# Patient Record
Sex: FEMALE | Race: WHITE | NOT HISPANIC OR LATINO | Employment: OTHER | URBAN - METROPOLITAN AREA
[De-identification: names, ages, dates, MRNs, and addresses within clinical notes are randomized per-mention and may not be internally consistent; named-entity substitution may affect disease eponyms.]

---

## 2017-01-21 ENCOUNTER — LAB CONVERSION - ENCOUNTER (OUTPATIENT)
Dept: OTHER | Facility: OTHER | Age: 62
End: 2017-01-21

## 2017-01-21 LAB
BUN SERPL-MCNC: 22 MG/DL (ref 7–25)
BUN/CREA RATIO (HISTORICAL): ABNORMAL (CALC) (ref 6–22)
CALCIUM SERPL-MCNC: 9.5 MG/DL (ref 8.6–10.4)
CHLORIDE SERPL-SCNC: 104 MMOL/L (ref 98–110)
CO2 SERPL-SCNC: 29 MMOL/L (ref 20–31)
CREAT SERPL-MCNC: 0.81 MG/DL (ref 0.5–0.99)
EGFR AFRICAN AMERICAN (HISTORICAL): 91 ML/MIN/1.73M2
EGFR-AMERICAN CALC (HISTORICAL): 78 ML/MIN/1.73M2
GLUCOSE (HISTORICAL): 119 MG/DL (ref 65–99)
HBA1C MFR BLD HPLC: 6.4 % OF TOTAL HGB
POTASSIUM SERPL-SCNC: 4.4 MMOL/L (ref 3.5–5.3)
SODIUM SERPL-SCNC: 140 MMOL/L (ref 135–146)

## 2017-01-24 ENCOUNTER — ALLSCRIPTS OFFICE VISIT (OUTPATIENT)
Dept: OTHER | Facility: OTHER | Age: 62
End: 2017-01-24

## 2017-01-24 DIAGNOSIS — Z12.11 ENCOUNTER FOR SCREENING FOR MALIGNANT NEOPLASM OF COLON: ICD-10-CM

## 2017-01-24 DIAGNOSIS — Z12.31 ENCOUNTER FOR SCREENING MAMMOGRAM FOR MALIGNANT NEOPLASM OF BREAST: ICD-10-CM

## 2017-02-09 ENCOUNTER — LAB CONVERSION - ENCOUNTER (OUTPATIENT)
Dept: OTHER | Facility: OTHER | Age: 62
End: 2017-02-09

## 2017-02-10 ENCOUNTER — GENERIC CONVERSION - ENCOUNTER (OUTPATIENT)
Dept: OTHER | Facility: OTHER | Age: 62
End: 2017-02-10

## 2017-02-15 ENCOUNTER — ALLSCRIPTS OFFICE VISIT (OUTPATIENT)
Dept: OTHER | Facility: OTHER | Age: 62
End: 2017-02-15

## 2017-02-15 ENCOUNTER — LAB REQUISITION (OUTPATIENT)
Dept: LAB | Facility: HOSPITAL | Age: 62
End: 2017-02-15
Payer: COMMERCIAL

## 2017-02-15 DIAGNOSIS — Z01.419 ENCOUNTER FOR GYNECOLOGICAL EXAMINATION WITHOUT ABNORMAL FINDING: ICD-10-CM

## 2017-02-15 PROCEDURE — G0145 SCR C/V CYTO,THINLAYER,RESCR: HCPCS | Performed by: OBSTETRICS & GYNECOLOGY

## 2017-02-15 PROCEDURE — 87624 HPV HI-RISK TYP POOLED RSLT: CPT | Performed by: OBSTETRICS & GYNECOLOGY

## 2017-02-20 LAB — HPV RRNA GENITAL QL NAA+PROBE: NORMAL

## 2017-02-23 LAB
LAB AP GYN PRIMARY INTERPRETATION: NORMAL
Lab: NORMAL

## 2017-03-01 ENCOUNTER — LAB REQUISITION (OUTPATIENT)
Dept: LAB | Facility: HOSPITAL | Age: 62
End: 2017-03-01
Payer: COMMERCIAL

## 2017-03-01 ENCOUNTER — ALLSCRIPTS OFFICE VISIT (OUTPATIENT)
Dept: OTHER | Facility: OTHER | Age: 62
End: 2017-03-01

## 2017-03-01 ENCOUNTER — HOSPITAL ENCOUNTER (OUTPATIENT)
Dept: RADIOLOGY | Facility: HOSPITAL | Age: 62
Discharge: HOME | End: 2017-03-01
Attending: OBSTETRICS & GYNECOLOGY
Payer: COMMERCIAL

## 2017-03-01 DIAGNOSIS — Z12.31 ENCOUNTER FOR SCREENING MAMMOGRAM FOR MALIGNANT NEOPLASM OF BREAST: ICD-10-CM

## 2017-03-01 DIAGNOSIS — N90.7 VULVAR CYST: ICD-10-CM

## 2017-03-01 PROCEDURE — 88304 TISSUE EXAM BY PATHOLOGIST: CPT | Performed by: OBSTETRICS & GYNECOLOGY

## 2017-03-01 PROCEDURE — 88312 SPECIAL STAINS GROUP 1: CPT | Performed by: OBSTETRICS & GYNECOLOGY

## 2017-03-01 PROCEDURE — G0202 SCR MAMMO BI INCL CAD: HCPCS

## 2017-03-08 ENCOUNTER — ALLSCRIPTS OFFICE VISIT (OUTPATIENT)
Dept: OTHER | Facility: OTHER | Age: 62
End: 2017-03-08

## 2017-03-08 DIAGNOSIS — M79.644 PAIN OF FINGER OF RIGHT HAND: ICD-10-CM

## 2017-03-08 DIAGNOSIS — M25.562 PAIN IN LEFT KNEE: ICD-10-CM

## 2017-03-09 ENCOUNTER — TRANSCRIBE ORDERS (OUTPATIENT)
Dept: RADIOLOGY | Facility: HOSPITAL | Age: 62
End: 2017-03-09

## 2017-03-09 ENCOUNTER — HOSPITAL ENCOUNTER (OUTPATIENT)
Dept: RADIOLOGY | Facility: HOSPITAL | Age: 62
Discharge: HOME | End: 2017-03-09
Payer: COMMERCIAL

## 2017-03-09 DIAGNOSIS — M25.562 PAIN IN LEFT KNEE: ICD-10-CM

## 2017-03-09 PROCEDURE — 73562 X-RAY EXAM OF KNEE 3: CPT

## 2017-03-15 ENCOUNTER — GENERIC CONVERSION - ENCOUNTER (OUTPATIENT)
Dept: OTHER | Facility: OTHER | Age: 62
End: 2017-03-15

## 2017-07-05 ENCOUNTER — ALLSCRIPTS OFFICE VISIT (OUTPATIENT)
Dept: OTHER | Facility: OTHER | Age: 62
End: 2017-07-05

## 2017-10-03 ENCOUNTER — GENERIC CONVERSION - ENCOUNTER (OUTPATIENT)
Dept: OTHER | Facility: OTHER | Age: 62
End: 2017-10-03

## 2017-12-01 ENCOUNTER — GENERIC CONVERSION - ENCOUNTER (OUTPATIENT)
Dept: OTHER | Facility: OTHER | Age: 62
End: 2017-12-01

## 2018-01-11 NOTE — RESULT NOTES
Verified Results  (Q) FECAL GLOBIN BY IMMUNOCHEMISTRY [id number] 12:00AM Dineen Canavan   REPORT COMMENT:  FEJustin Lolo Quiroga Herbert [id number]     Test Name Result Flag Reference   FECAL GLOBIN BY$IMMUNOCHEMISTRY      FECAL GLOBIN BY IMMUNOCHEMISTRY         MICRO NUMBER:      [id number]    TEST STATUS:       FINAL    SPECIMEN SOURCE:   INSURE (TM) FOBT TEST CARD    SPECIMEN QUALITY:  ADEQUATE    RESULT:            Not Detected                                               We received an InSure card with a non-specific                       order  Based upon the specimen submitted, the                       fecal globin test was performed  If this is not                       what you intended to order, please contact your                       local  immediately                       so that we can adjust our billing appropriately  You may also inquire about alternative or                        additional testing

## 2018-01-11 NOTE — RESULT NOTES
Verified Results  * XR KNEE 3 VW LEFT NON INJURY [id number] 03:54PM Bryan Hilario Order Number: [id number]     Test Name Result Flag Reference   XR KNEE 3 VW LEFT (Report)     LEFT KNEE     INDICATION: Left knee pain  COMPARISON: None     VIEWS: 3     IMAGES: 3     FINDINGS:     There is no acute fracture or dislocation  There is no joint effusion  No degenerative changes  No lytic or blastic lesions are seen  Vascular calcifications  IMPRESSION:     No acute osseous abnormality         Workstation performed: [id number]     Signed by:   Leonardo Nuñez DO   3/11/17

## 2018-01-12 VITALS
SYSTOLIC BLOOD PRESSURE: 129 MMHG | HEART RATE: 74 BPM | DIASTOLIC BLOOD PRESSURE: 79 MMHG | BODY MASS INDEX: 32.54 KG/M2 | WEIGHT: 214 LBS

## 2018-01-12 DIAGNOSIS — E11.9 TYPE 2 DIABETES MELLITUS WITHOUT COMPLICATIONS (HCC): ICD-10-CM

## 2018-01-12 DIAGNOSIS — E78.5 HYPERLIPIDEMIA: ICD-10-CM

## 2018-01-13 VITALS
BODY MASS INDEX: 33.08 KG/M2 | SYSTOLIC BLOOD PRESSURE: 133 MMHG | HEART RATE: 67 BPM | DIASTOLIC BLOOD PRESSURE: 81 MMHG | HEIGHT: 68 IN | WEIGHT: 218.25 LBS

## 2018-01-13 VITALS
WEIGHT: 213 LBS | BODY MASS INDEX: 32.28 KG/M2 | SYSTOLIC BLOOD PRESSURE: 132 MMHG | HEIGHT: 68 IN | DIASTOLIC BLOOD PRESSURE: 68 MMHG

## 2018-01-13 VITALS
DIASTOLIC BLOOD PRESSURE: 76 MMHG | HEIGHT: 68 IN | WEIGHT: 211.8 LBS | SYSTOLIC BLOOD PRESSURE: 130 MMHG | BODY MASS INDEX: 32.1 KG/M2 | HEART RATE: 76 BPM

## 2018-01-14 VITALS
SYSTOLIC BLOOD PRESSURE: 122 MMHG | WEIGHT: 212.8 LBS | TEMPERATURE: 96.3 F | HEART RATE: 77 BPM | HEIGHT: 68 IN | DIASTOLIC BLOOD PRESSURE: 78 MMHG | BODY MASS INDEX: 32.25 KG/M2

## 2018-01-14 VITALS
DIASTOLIC BLOOD PRESSURE: 72 MMHG | WEIGHT: 209.5 LBS | HEIGHT: 68 IN | SYSTOLIC BLOOD PRESSURE: 140 MMHG | BODY MASS INDEX: 31.75 KG/M2

## 2018-01-24 VITALS
HEIGHT: 68 IN | HEART RATE: 69 BPM | DIASTOLIC BLOOD PRESSURE: 75 MMHG | WEIGHT: 218 LBS | SYSTOLIC BLOOD PRESSURE: 131 MMHG | BODY MASS INDEX: 33.04 KG/M2

## 2018-01-26 ENCOUNTER — OFFICE VISIT (OUTPATIENT)
Dept: FAMILY MEDICINE CLINIC | Facility: CLINIC | Age: 63
End: 2018-01-26
Payer: COMMERCIAL

## 2018-01-26 VITALS
HEART RATE: 72 BPM | DIASTOLIC BLOOD PRESSURE: 80 MMHG | RESPIRATION RATE: 18 BRPM | SYSTOLIC BLOOD PRESSURE: 142 MMHG | TEMPERATURE: 97.3 F | WEIGHT: 221.13 LBS | BODY MASS INDEX: 34.71 KG/M2 | HEIGHT: 67 IN

## 2018-01-26 DIAGNOSIS — E11.9 TYPE 2 DIABETES MELLITUS WITHOUT COMPLICATION, WITHOUT LONG-TERM CURRENT USE OF INSULIN (HCC): Primary | ICD-10-CM

## 2018-01-26 DIAGNOSIS — I25.118 CORONARY ARTERY DISEASE INVOLVING NATIVE HEART WITH OTHER FORM OF ANGINA PECTORIS, UNSPECIFIED VESSEL OR LESION TYPE (HCC): ICD-10-CM

## 2018-01-26 PROCEDURE — 99214 OFFICE O/P EST MOD 30 MIN: CPT | Performed by: FAMILY MEDICINE

## 2018-01-26 RX ORDER — METFORMIN HYDROCHLORIDE EXTENDED-RELEASE TABLETS 1000 MG/1
1 TABLET, FILM COATED, EXTENDED RELEASE ORAL 2 TIMES DAILY
COMMUNITY
Start: 2014-07-14 | End: 2018-01-26

## 2018-01-26 RX ORDER — CLOPIDOGREL BISULFATE 75 MG/1
TABLET ORAL
COMMUNITY
Start: 2016-09-29 | End: 2018-08-16 | Stop reason: SDUPTHER

## 2018-01-26 RX ORDER — NITROGLYCERIN 0.4 MG/1
1 TABLET SUBLINGUAL
COMMUNITY
End: 2018-01-26 | Stop reason: SDUPTHER

## 2018-01-26 RX ORDER — NITROGLYCERIN 0.4 MG/1
0.4 TABLET SUBLINGUAL
Qty: 100 TABLET | Refills: 0 | Status: SHIPPED | OUTPATIENT
Start: 2018-01-26 | End: 2019-06-27 | Stop reason: SDUPTHER

## 2018-01-26 RX ORDER — RABEPRAZOLE SODIUM 20 MG/1
1 TABLET, DELAYED RELEASE ORAL DAILY
COMMUNITY
Start: 2016-03-22 | End: 2019-01-09 | Stop reason: SDUPTHER

## 2018-01-26 RX ORDER — METOPROLOL TARTRATE 50 MG/1
1 TABLET, FILM COATED ORAL DAILY
COMMUNITY
End: 2019-03-22 | Stop reason: SDUPTHER

## 2018-01-26 RX ORDER — EZETIMIBE AND SIMVASTATIN 10; 80 MG/1; MG/1
1 TABLET ORAL DAILY
COMMUNITY
Start: 2017-12-13 | End: 2018-04-03 | Stop reason: SDUPTHER

## 2018-01-26 RX ORDER — FELODIPINE 2.5 MG/1
1 TABLET, EXTENDED RELEASE ORAL DAILY
COMMUNITY
End: 2018-07-26 | Stop reason: SDUPTHER

## 2018-01-26 RX ORDER — TRAMADOL HYDROCHLORIDE 50 MG/1
1 TABLET ORAL 4 TIMES DAILY PRN
COMMUNITY
Start: 2014-08-18 | End: 2018-03-26 | Stop reason: SDUPTHER

## 2018-01-26 RX ORDER — VENLAFAXINE HYDROCHLORIDE 75 MG/1
1 CAPSULE, EXTENDED RELEASE ORAL DAILY
COMMUNITY
Start: 2016-02-05 | End: 2018-05-26 | Stop reason: SDUPTHER

## 2018-01-26 NOTE — PROGRESS NOTES
Assessment/Plan:    No problem-specific Assessment & Plan notes found for this encounter  There are no diagnoses linked to this encounter  Subjective:      Patient ID: Mable Landaverde is a 58 y o  female      HERE FOR FOLLOW UP OF DIABETES did  Not get lab      Diabetes         The following portions of the patient's history were reviewed and updated as appropriate: allergies, current medications, past family history, past medical history, past social history, past surgical history and problem list     Review of Systems      Objective:     Physical Exam

## 2018-02-27 LAB
ALBUMIN SERPL-MCNC: 4.4 G/DL (ref 3.6–5.1)
ALBUMIN/GLOB SERPL: 1.7 (CALC) (ref 1–2.5)
ALP SERPL-CCNC: 64 U/L (ref 33–130)
ALT SERPL-CCNC: 20 U/L (ref 6–29)
AST SERPL-CCNC: 16 U/L (ref 10–35)
BILIRUB SERPL-MCNC: 0.5 MG/DL (ref 0.2–1.2)
BUN SERPL-MCNC: 12 MG/DL (ref 7–25)
BUN/CREAT SERPL: ABNORMAL (CALC) (ref 6–22)
CALCIUM SERPL-MCNC: 9.5 MG/DL (ref 8.6–10.4)
CHLORIDE SERPL-SCNC: 103 MMOL/L (ref 98–110)
CHOLEST SERPL-MCNC: 179 MG/DL
CHOLEST/HDLC SERPL: 3.7 (CALC)
CO2 SERPL-SCNC: 29 MMOL/L (ref 20–31)
CREAT SERPL-MCNC: 0.79 MG/DL (ref 0.5–0.99)
GLOBULIN SER CALC-MCNC: 2.6 G/DL (CALC) (ref 1.9–3.7)
GLUCOSE SERPL-MCNC: 157 MG/DL (ref 65–99)
HDLC SERPL-MCNC: 48 MG/DL
LDLC SERPL CALC-MCNC: 99 MG/DL (CALC)
NONHDLC SERPL-MCNC: 131 MG/DL (CALC)
POTASSIUM SERPL-SCNC: 4.6 MMOL/L (ref 3.5–5.3)
PROT SERPL-MCNC: 7 G/DL (ref 6.1–8.1)
SL AMB EGFR AFRICAN AMERICAN: 93 ML/MIN/1.73M2
SL AMB EGFR NON AFRICAN AMERICAN: 80 ML/MIN/1.73M2
SODIUM SERPL-SCNC: 141 MMOL/L (ref 135–146)
TRIGL SERPL-MCNC: 208 MG/DL
TSH SERPL-ACNC: 3.18 MIU/L (ref 0.4–4.5)

## 2018-03-01 ENCOUNTER — TELEPHONE (OUTPATIENT)
Dept: FAMILY MEDICINE CLINIC | Facility: CLINIC | Age: 63
End: 2018-03-01

## 2018-03-01 NOTE — TELEPHONE ENCOUNTER
Patient notified of results  Patient has an appointment scheduled on 6/27/2018        ----- Message from Izzy Monteiro, DO sent at 2/28/2018  7:00 PM EST -----  Labs look pretty good exceppt sugar a bit higher than we like   Next ov?   We will recheck and adjust meds

## 2018-03-26 DIAGNOSIS — R52 PAIN: Primary | ICD-10-CM

## 2018-03-28 RX ORDER — TRAMADOL HYDROCHLORIDE 50 MG/1
50 TABLET ORAL 4 TIMES DAILY PRN
Qty: 120 TABLET | Refills: 0 | Status: SHIPPED | OUTPATIENT
Start: 2018-03-28 | End: 2018-04-03 | Stop reason: SDUPTHER

## 2018-03-30 ENCOUNTER — TELEPHONE (OUTPATIENT)
Dept: FAMILY MEDICINE CLINIC | Facility: CLINIC | Age: 63
End: 2018-03-30

## 2018-03-30 DIAGNOSIS — E78.5 HYPERLIPIDEMIA, UNSPECIFIED HYPERLIPIDEMIA TYPE: Primary | ICD-10-CM

## 2018-03-30 RX ORDER — EZETIMIBE AND SIMVASTATIN 10; 80 MG/1; MG/1
1 TABLET ORAL DAILY
Qty: 90 TABLET | Refills: 0 | Status: CANCELLED | OUTPATIENT
Start: 2018-03-30

## 2018-04-03 DIAGNOSIS — E78.5 HYPERLIPIDEMIA, UNSPECIFIED HYPERLIPIDEMIA TYPE: Primary | ICD-10-CM

## 2018-04-03 DIAGNOSIS — R52 PAIN: ICD-10-CM

## 2018-04-03 RX ORDER — EZETIMIBE AND SIMVASTATIN 10; 80 MG/1; MG/1
1 TABLET ORAL DAILY
Qty: 90 TABLET | Refills: 0 | Status: SHIPPED | OUTPATIENT
Start: 2018-04-03 | End: 2018-07-02

## 2018-04-03 RX ORDER — EZETIMIBE 10 MG/1
10 TABLET ORAL DAILY
Qty: 90 TABLET | Refills: 0 | Status: SHIPPED | OUTPATIENT
Start: 2018-04-03 | End: 2018-07-07 | Stop reason: SDUPTHER

## 2018-04-03 RX ORDER — SIMVASTATIN 80 MG
80 TABLET ORAL
Qty: 90 TABLET | Refills: 0 | Status: SHIPPED | OUTPATIENT
Start: 2018-04-03 | End: 2018-07-07 | Stop reason: SDUPTHER

## 2018-04-03 RX ORDER — TRAMADOL HYDROCHLORIDE 50 MG/1
50 TABLET ORAL EVERY 6 HOURS PRN
Qty: 120 TABLET | OUTPATIENT
Start: 2018-04-03 | End: 2018-05-03

## 2018-04-03 RX ORDER — TRAMADOL HYDROCHLORIDE 50 MG/1
50 TABLET ORAL 4 TIMES DAILY PRN
Qty: 120 TABLET | Refills: 0 | Status: SHIPPED | OUTPATIENT
Start: 2018-04-03 | End: 2018-05-03

## 2018-04-04 ENCOUNTER — TELEPHONE (OUTPATIENT)
Dept: FAMILY MEDICINE CLINIC | Facility: CLINIC | Age: 63
End: 2018-04-04

## 2018-05-10 DIAGNOSIS — E11.9 TYPE 2 DIABETES MELLITUS WITHOUT COMPLICATION, WITHOUT LONG-TERM CURRENT USE OF INSULIN (HCC): ICD-10-CM

## 2018-05-18 ENCOUNTER — TELEPHONE (OUTPATIENT)
Dept: FAMILY MEDICINE CLINIC | Facility: CLINIC | Age: 63
End: 2018-05-18

## 2018-05-21 ENCOUNTER — TELEPHONE (OUTPATIENT)
Dept: FAMILY MEDICINE CLINIC | Facility: CLINIC | Age: 63
End: 2018-05-21

## 2018-05-26 DIAGNOSIS — F32.A DEPRESSION, UNSPECIFIED DEPRESSION TYPE: Primary | ICD-10-CM

## 2018-06-01 RX ORDER — VENLAFAXINE HYDROCHLORIDE 75 MG/1
CAPSULE, EXTENDED RELEASE ORAL
Qty: 90 CAPSULE | Refills: 2 | Status: SHIPPED | OUTPATIENT
Start: 2018-06-01 | End: 2019-01-09 | Stop reason: SDUPTHER

## 2018-06-26 PROBLEM — M79.644 PAIN OF FINGER OF RIGHT HAND: Status: ACTIVE | Noted: 2017-03-08

## 2018-06-26 PROBLEM — M65.331 TRIGGER MIDDLE FINGER OF RIGHT HAND: Status: ACTIVE | Noted: 2017-01-24

## 2018-06-26 RX ORDER — PEN NEEDLE, DIABETIC 31 GX5/16"
1 NEEDLE, DISPOSABLE MISCELLANEOUS
COMMUNITY
Start: 2018-04-05 | End: 2018-08-16 | Stop reason: SDUPTHER

## 2018-06-26 RX ORDER — TRAMADOL HYDROCHLORIDE 50 MG/1
TABLET ORAL
COMMUNITY
Start: 2018-05-23 | End: 2018-07-16 | Stop reason: SDUPTHER

## 2018-06-27 ENCOUNTER — OFFICE VISIT (OUTPATIENT)
Dept: FAMILY MEDICINE CLINIC | Facility: CLINIC | Age: 63
End: 2018-06-27
Payer: COMMERCIAL

## 2018-06-27 VITALS
WEIGHT: 221.6 LBS | TEMPERATURE: 96.2 F | BODY MASS INDEX: 34.78 KG/M2 | RESPIRATION RATE: 18 BRPM | SYSTOLIC BLOOD PRESSURE: 130 MMHG | DIASTOLIC BLOOD PRESSURE: 72 MMHG | HEIGHT: 67 IN | HEART RATE: 64 BPM

## 2018-06-27 DIAGNOSIS — Z11.59 ENCOUNTER FOR HEPATITIS C SCREENING TEST FOR LOW RISK PATIENT: ICD-10-CM

## 2018-06-27 DIAGNOSIS — Z12.39 SCREENING FOR BREAST CANCER: ICD-10-CM

## 2018-06-27 DIAGNOSIS — E78.2 MIXED HYPERLIPIDEMIA: ICD-10-CM

## 2018-06-27 DIAGNOSIS — E11.9 TYPE 2 DIABETES MELLITUS WITHOUT COMPLICATION, WITHOUT LONG-TERM CURRENT USE OF INSULIN (HCC): Primary | ICD-10-CM

## 2018-06-27 DIAGNOSIS — Z12.11 SCREEN FOR COLON CANCER: ICD-10-CM

## 2018-06-27 DIAGNOSIS — I10 BENIGN ESSENTIAL HYPERTENSION: ICD-10-CM

## 2018-06-27 PROCEDURE — 3075F SYST BP GE 130 - 139MM HG: CPT | Performed by: NURSE PRACTITIONER

## 2018-06-27 PROCEDURE — 99214 OFFICE O/P EST MOD 30 MIN: CPT | Performed by: NURSE PRACTITIONER

## 2018-06-27 PROCEDURE — 3078F DIAST BP <80 MM HG: CPT | Performed by: NURSE PRACTITIONER

## 2018-06-27 NOTE — ASSESSMENT & PLAN NOTE
Lab Results   Component Value Date    HGBA1C 6 4 (H) 01/20/2017       No results for input(s): POCGLU in the last 72 hours  Blood Sugar Average: Last 72 hrs:       Patient states she checks her sugars occasionally

## 2018-06-27 NOTE — PROGRESS NOTES
Chief Complaint   Patient presents with    Diabetes       Assessment/Plan:    Type 2 diabetes mellitus without complication, without long-term current use of insulin (HCC)  Lab Results   Component Value Date    HGBA1C 6 4 (H) 01/20/2017       No results for input(s): POCGLU in the last 72 hours  Blood Sugar Average: Last 72 hrs:       Patient states she checks her sugars occasionally  Mixed hyperlipidemia  TRIGs 208 LDL 99       Diagnoses and all orders for this visit:    Type 2 diabetes mellitus without complication, without long-term current use of insulin (HCC)  -     Hemoglobin A1C  -     Lipid panel  -     Microalbumin / creatinine urine ratio    Benign essential hypertension  -     Hemoglobin A1C  -     Lipid panel    Mixed hyperlipidemia  -     Hemoglobin A1C  -     Lipid panel    Screening for breast cancer  -     Mammo screening bilateral w cad; Future    Screen for colon cancer  -     Occult Bloood,Fecal Immunochemical; Future    Encounter for hepatitis C screening test for low risk patient  -     Hepatitis C antibody; Future        Patient states she ok on any medication  Subjective:      Patient ID: Noelle Dozier is a 58 y o  female  patietn here for her follow up on her diabetes, high blood pressure and cholesterol  The following portions of the patient's history were reviewed and updated as appropriate: allergies, current medications, past family history, past medical history, past social history, past surgical history and problem list     Review of Systems   Constitutional: Negative for chills, diaphoresis, fatigue, fever and unexpected weight change  HENT: Negative for trouble swallowing  Eyes: Negative for visual disturbance  Respiratory: Negative for cough, chest tightness and shortness of breath  Cardiovascular: Negative for chest pain, palpitations and leg swelling  Gastrointestinal: Negative for diarrhea     Endocrine: Negative for polydipsia, polyphagia and polyuria  Genitourinary: Negative for decreased urine volume  Musculoskeletal: Negative for myalgias  Skin: Negative for wound  Neurological: Negative for dizziness, weakness, light-headedness and numbness  Objective:      /72 (BP Location: Left arm, Patient Position: Sitting, Cuff Size: Adult)   Pulse 64   Temp (!) 96 2 °F (35 7 °C) (Tympanic)   Resp 18   Ht 5' 7 25" (1 708 m)   Wt 101 kg (221 lb 9 6 oz)   Breastfeeding? No   BMI 34 45 kg/m²          Physical Exam   Constitutional: Vital signs are normal  She appears well-developed and well-nourished  She does not have a sickly appearance  She does not appear ill  HENT:   Head: Normocephalic and atraumatic  Neck: Carotid bruit is not present  No thyromegaly present  Cardiovascular: Normal rate, regular rhythm, S1 normal and S2 normal   Pulses are no weak pulses  No murmur heard  Pulses:       Carotid pulses are 2+ on the right side, and 2+ on the left side  Dorsalis pedis pulses are 2+ on the right side, and 2+ on the left side  Posterior tibial pulses are 2+ on the right side, and 2+ on the left side  No Lower extremity Edema   Pulmonary/Chest: Effort normal and breath sounds normal    Abdominal: Soft  Normal appearance and bowel sounds are normal  There is no tenderness  Feet:   Right Foot:   Skin Integrity: Negative for ulcer, skin breakdown, erythema, warmth, callus or dry skin  Left Foot:   Skin Integrity: Negative for ulcer, skin breakdown, erythema, warmth, callus or dry skin  Lymphadenopathy:     She has no cervical adenopathy  Skin: Skin is warm, dry and intact  Patient's shoes and socks removed  Right Foot/Ankle   Right Foot Inspection  Skin Exam: skin normal and skin intact no dry skin, no warmth, no callus, no erythema, no maceration, no abnormal color, no pre-ulcer, no ulcer and no callus                          Toe Exam: ROM and strength within normal limits  Sensory Vibration: intact  Proprioception: intact   Monofilament testing: intact  Vascular  Capillary refills: < 3 seconds  The right DP pulse is 2+  The right PT pulse is 2+  Left Foot/Ankle  Left Foot Inspection  Skin Exam: skin normal and skin intactno dry skin, no warmth, no erythema, no maceration, normal color, no pre-ulcer, no ulcer and no callus                         Toe Exam: ROM and strength within normal limits                   Sensory   Vibration: intact  Proprioception: intact  Monofilament: intact  Vascular  Capillary refills: < 3 seconds  The left DP pulse is 2+  The left PT pulse is 2+  Assign Risk Category:  No deformity present; No loss of protective sensation;  No weak pulses       Risk: 0

## 2018-07-01 DIAGNOSIS — E78.5 HYPERLIPIDEMIA, UNSPECIFIED HYPERLIPIDEMIA TYPE: ICD-10-CM

## 2018-07-01 RX ORDER — SIMVASTATIN 80 MG
80 TABLET ORAL
Qty: 90 TABLET | Refills: 0 | OUTPATIENT
Start: 2018-07-01 | End: 2018-09-29

## 2018-07-01 RX ORDER — EZETIMIBE 10 MG/1
10 TABLET ORAL DAILY
Qty: 90 TABLET | Refills: 0 | OUTPATIENT
Start: 2018-07-01 | End: 2018-09-29

## 2018-07-07 DIAGNOSIS — E78.5 HYPERLIPIDEMIA, UNSPECIFIED HYPERLIPIDEMIA TYPE: ICD-10-CM

## 2018-07-08 RX ORDER — SIMVASTATIN 80 MG
80 TABLET ORAL
Qty: 90 TABLET | Refills: 0 | Status: SHIPPED | OUTPATIENT
Start: 2018-07-08 | End: 2018-07-10 | Stop reason: SDUPTHER

## 2018-07-08 RX ORDER — EZETIMIBE 10 MG/1
10 TABLET ORAL DAILY
Qty: 90 TABLET | Refills: 0 | Status: SHIPPED | OUTPATIENT
Start: 2018-07-08 | End: 2018-07-10 | Stop reason: SDUPTHER

## 2018-07-10 DIAGNOSIS — E78.5 HYPERLIPIDEMIA, UNSPECIFIED HYPERLIPIDEMIA TYPE: ICD-10-CM

## 2018-07-10 RX ORDER — EZETIMIBE 10 MG/1
10 TABLET ORAL DAILY
Qty: 90 TABLET | Refills: 0 | Status: SHIPPED | OUTPATIENT
Start: 2018-07-10 | End: 2018-12-05 | Stop reason: SDUPTHER

## 2018-07-10 RX ORDER — SIMVASTATIN 80 MG
80 TABLET ORAL
Qty: 90 TABLET | Refills: 0 | Status: SHIPPED | OUTPATIENT
Start: 2018-07-10 | End: 2018-12-07 | Stop reason: SDUPTHER

## 2018-07-10 NOTE — TELEPHONE ENCOUNTER
Patient left voicemail requesting medication refill on her Simvastatin 80 Mg and Ezetimibe 10 Mg to be sent to  [facility]    Patient was last seen on 6/27/2018

## 2018-07-11 ENCOUNTER — TELEPHONE (OUTPATIENT)
Dept: FAMILY MEDICINE CLINIC | Facility: CLINIC | Age: 63
End: 2018-07-11

## 2018-07-11 DIAGNOSIS — M54.5 CHRONIC LOW BACK PAIN, UNSPECIFIED BACK PAIN LATERALITY, WITH SCIATICA PRESENCE UNSPECIFIED: Primary | ICD-10-CM

## 2018-07-11 DIAGNOSIS — G89.29 CHRONIC LOW BACK PAIN, UNSPECIFIED BACK PAIN LATERALITY, WITH SCIATICA PRESENCE UNSPECIFIED: Primary | ICD-10-CM

## 2018-07-11 NOTE — TELEPHONE ENCOUNTER
Pt needs her tramadol rx renewed  She gets 50mg tabs  Stated she used to get 1 tab 4 times a day for 30 days but she only takes 1 tab a day  We did have issues w/ PA in past  If you could do qty 30 for 30 days maybe her insurance will approve this w/ o any authorization  Last time pt stated that they would only prescribe her 8 pills  Pt was seen on June 27, 2018 by you  She said she has enough for 2 more weeks but not sure if authorization is needed this time  [facility] [address]  Thanks  Please keep pt updated on any PA statuses if one is needed

## 2018-07-13 ENCOUNTER — APPOINTMENT (OUTPATIENT)
Dept: LAB | Facility: HOSPITAL | Age: 63
End: 2018-07-13
Payer: COMMERCIAL

## 2018-07-13 DIAGNOSIS — Z12.11 SCREEN FOR COLON CANCER: ICD-10-CM

## 2018-07-13 LAB — HEMOCCULT STL QL IA: POSITIVE

## 2018-07-13 PROCEDURE — G0328 FECAL BLOOD SCRN IMMUNOASSAY: HCPCS

## 2018-07-15 DIAGNOSIS — Z12.11 SCREEN FOR COLON CANCER: ICD-10-CM

## 2018-07-15 DIAGNOSIS — R19.5 POSITIVE FECAL OCCULT BLOOD TEST: Primary | ICD-10-CM

## 2018-07-16 ENCOUNTER — TELEPHONE (OUTPATIENT)
Dept: FAMILY MEDICINE CLINIC | Facility: CLINIC | Age: 63
End: 2018-07-16

## 2018-07-16 RX ORDER — TRAMADOL HYDROCHLORIDE 50 MG/1
50 TABLET ORAL DAILY
Qty: 30 TABLET | Refills: 0 | Status: SHIPPED | OUTPATIENT
Start: 2018-07-16 | End: 2018-08-28 | Stop reason: SDUPTHER

## 2018-07-16 NOTE — TELEPHONE ENCOUNTER
----- Message from [address] [place] [address] sent at 7/15/2018 11:35 PM EDT -----  Fecal occult positive  recommend GI eval for full colonoscopy

## 2018-07-16 NOTE — TELEPHONE ENCOUNTER
Pt notified of results and recommendations  Pt wanted to know if she can wait a couple of weeks and have the home kit redone since she has tested neg in the past  Psl advise

## 2018-07-20 DIAGNOSIS — E11.9 TYPE 2 DIABETES MELLITUS WITHOUT COMPLICATION, WITHOUT LONG-TERM CURRENT USE OF INSULIN (HCC): ICD-10-CM

## 2018-07-26 DIAGNOSIS — I10 BENIGN ESSENTIAL HYPERTENSION: Primary | ICD-10-CM

## 2018-07-26 NOTE — TELEPHONE ENCOUNTER
Patient states she needs a prior auth number and to call [phone]  I started the prior auth on navinet, and I called and spoke to [facility] and she does not need prior auth for tramadol  I notified pt

## 2018-07-27 RX ORDER — FELODIPINE 2.5 MG/1
2.5 TABLET, EXTENDED RELEASE ORAL DAILY
Qty: 30 TABLET | Refills: 3 | Status: SHIPPED | OUTPATIENT
Start: 2018-07-27

## 2018-08-06 ENCOUNTER — HOSPITAL ENCOUNTER (OUTPATIENT)
Dept: RADIOLOGY | Facility: HOSPITAL | Age: 63
Discharge: HOME | End: 2018-08-06
Payer: COMMERCIAL

## 2018-08-06 ENCOUNTER — TELEPHONE (OUTPATIENT)
Dept: FAMILY MEDICINE CLINIC | Facility: CLINIC | Age: 63
End: 2018-08-06

## 2018-08-06 DIAGNOSIS — Z12.39 SCREENING FOR BREAST CANCER: ICD-10-CM

## 2018-08-06 PROCEDURE — 77067 SCR MAMMO BI INCL CAD: CPT

## 2018-08-06 NOTE — TELEPHONE ENCOUNTER
----- Message from [address] [place] [address] sent at 8/6/2018  5:34 PM EDT -----  mammo normal

## 2018-08-16 DIAGNOSIS — E11.9 TYPE 2 DIABETES MELLITUS WITHOUT COMPLICATION, WITHOUT LONG-TERM CURRENT USE OF INSULIN (HCC): ICD-10-CM

## 2018-08-16 DIAGNOSIS — I25.10 CORONARY ARTERY DISEASE INVOLVING NATIVE CORONARY ARTERY OF NATIVE HEART, ANGINA PRESENCE UNSPECIFIED: Primary | ICD-10-CM

## 2018-08-16 LAB
ALBUMIN/CREAT UR: 3 MCG/MG CREAT
CHOLEST SERPL-MCNC: 173 MG/DL
CHOLEST/HDLC SERPL: 3.7 (CALC)
CREAT UR-MCNC: 58 MG/DL (ref 20–320)
HBA1C MFR BLD: 7.6 % OF TOTAL HGB
HCV AB S/CO SERPL IA: 0.02
HCV AB SERPL QL IA: NORMAL
HDLC SERPL-MCNC: 47 MG/DL
LDLC SERPL CALC-MCNC: 99 MG/DL (CALC)
MICROALBUMIN UR-MCNC: 0.2 MG/DL
NONHDLC SERPL-MCNC: 126 MG/DL (CALC)
TRIGL SERPL-MCNC: 170 MG/DL

## 2018-08-16 NOTE — TELEPHONE ENCOUNTER
Pt called Rx line for med refill for her plavix 75mg 90 days to be [facility] to [facility], BD pen needles 90 day supply   Pt last ov 6/27/18 bw 8/15/18

## 2018-08-17 RX ORDER — PEN NEEDLE, DIABETIC 31 GX5/16"
1 NEEDLE, DISPOSABLE MISCELLANEOUS DAILY
Qty: 100 EACH | Refills: 0 | Status: SHIPPED | OUTPATIENT
Start: 2018-08-17 | End: 2018-11-20 | Stop reason: SDUPTHER

## 2018-08-17 RX ORDER — CLOPIDOGREL BISULFATE 75 MG/1
75 TABLET ORAL DAILY
Qty: 90 TABLET | Refills: 1 | Status: SHIPPED | OUTPATIENT
Start: 2018-08-17 | End: 2019-01-09 | Stop reason: SDUPTHER

## 2018-08-21 ENCOUNTER — TELEPHONE (OUTPATIENT)
Dept: FAMILY MEDICINE CLINIC | Facility: CLINIC | Age: 63
End: 2018-08-21

## 2018-08-21 NOTE — TELEPHONE ENCOUNTER
----- Message from Francis Eastman, [address] sent at 8/20/2018 10:59 PM EDT -----  Cholesterol is improving still could be better  A1c is up at 7 6%  Hep c negative  Monitor carbs really watch diet

## 2018-08-28 DIAGNOSIS — G89.29 CHRONIC LOW BACK PAIN, UNSPECIFIED BACK PAIN LATERALITY, WITH SCIATICA PRESENCE UNSPECIFIED: ICD-10-CM

## 2018-08-28 DIAGNOSIS — M54.5 CHRONIC LOW BACK PAIN, UNSPECIFIED BACK PAIN LATERALITY, WITH SCIATICA PRESENCE UNSPECIFIED: ICD-10-CM

## 2018-08-28 NOTE — TELEPHONE ENCOUNTER
Patient left voice mail requesting medication refill on her Tramadol 50 gm to be sent to her [facility] pharmacy on file    Patient's last OV was 6/27/2018

## 2018-08-29 RX ORDER — TRAMADOL HYDROCHLORIDE 50 MG/1
50 TABLET ORAL DAILY
Qty: 30 TABLET | Refills: 3 | Status: SHIPPED | OUTPATIENT
Start: 2018-08-29 | End: 2018-12-21 | Stop reason: SDUPTHER

## 2018-09-10 ENCOUNTER — OFFICE VISIT (OUTPATIENT)
Dept: GASTROENTEROLOGY | Facility: CLINIC | Age: 63
End: 2018-09-10
Payer: COMMERCIAL

## 2018-09-10 VITALS
WEIGHT: 226.2 LBS | SYSTOLIC BLOOD PRESSURE: 122 MMHG | TEMPERATURE: 97 F | DIASTOLIC BLOOD PRESSURE: 66 MMHG | HEART RATE: 67 BPM | HEIGHT: 67 IN | BODY MASS INDEX: 35.5 KG/M2

## 2018-09-10 DIAGNOSIS — R19.5 OCCULT BLOOD IN STOOLS: ICD-10-CM

## 2018-09-10 DIAGNOSIS — K21.9 GASTROESOPHAGEAL REFLUX DISEASE, ESOPHAGITIS PRESENCE NOT SPECIFIED: Primary | ICD-10-CM

## 2018-09-10 DIAGNOSIS — R19.5 POSITIVE FECAL OCCULT BLOOD TEST: ICD-10-CM

## 2018-09-10 DIAGNOSIS — Z12.11 SCREEN FOR COLON CANCER: ICD-10-CM

## 2018-09-10 PROCEDURE — 99244 OFF/OP CNSLTJ NEW/EST MOD 40: CPT | Performed by: INTERNAL MEDICINE

## 2018-09-10 RX ORDER — FAMOTIDINE 40 MG/1
40 TABLET, FILM COATED ORAL DAILY
Qty: 30 TABLET | Refills: 4 | Status: SHIPPED | OUTPATIENT
Start: 2018-09-10

## 2018-09-10 NOTE — PATIENT INSTRUCTIONS
EGD/COLON scheduled with Dr Hawk at [facility] on 10/17/2018  Osiel Hogue gave pt verbal instructions/paper work given  Prep Golytely/Dulcolax  Pt is Diabetic/medication clearance faxed to [facility]

## 2018-09-10 NOTE — PROGRESS NOTES
[facility] - Outpatient Consultation  Eun Estrada 58 y o  female MRN: [id number]  Encounter: [id number]          ASSESSMENT AND PLAN:      1  Positive fecal occult blood test  She was identified to have Hemoccult-positive without any evidence of hemorrhoids or any other cause explain this  It is potentially positive could be a false positive  EGD and colonoscopies is planned for further evaluation of this especially in setting of long-term reflux symptoms   - Case request operating room: EGD AND COLONOSCOPY; Standing  - Case request operating room: EGD AND COLONOSCOPY    2  Screen for colon cancer  - polyethylene glycol (GOLYTELY) 4000 mL solution; Take 4,000 mL by mouth once for 1 dose  Dispense: 4000 mL; Refill: 0    3  Gastroesophageal reflux disease, esophagitis presence not specified-she has had longstanding reflux symptoms last EGD was 8 years ago  Further evaluation is needed for hiatal hernia and Jaimes's esophagus  Will also try to wean her off PPI therapy and try to switch her back H2 blocker  We discuss long-term complication of PPI therapy as well  - Case request operating room: EGD AND COLONOSCOPY; Standing  - famotidine (PEPCID) 40 MG tablet; Take 1 tablet (40 mg total) by mouth daily  Dispense: 30 tablet; Refill: 4  - Case request operating room: EGD AND COLONOSCOPY    ______________________________________________________________________    HPI:     Patient is a 51-year-old presents here for evaluation of history of Hemoccult-positive testing and longstanding reflux  She has recently gained 20 lb which is likely the cause of worsening reflux symptoms  She has not had a recent endoscopy evaluation  She has never had a colonoscopy evaluation  She has been getting fecal occult testing which was positive recently  She presents here for evaluation of this  She does have longstanding history of reflux is well controlled on AcipHex once daily    She has been on PPI therapy for several years now  She has never tried H2 blockers in the past   No other alarm symptoms at this time  She does have history of CAD recent stress test was within normal limits  She has history cardiac stents currently on Plavix which will need to be held for the procedure  Will reach out to primary care doctor so Plavix can be held  REVIEW OF SYSTEMS:    CONSTITUTIONAL: Denies any fever, chills, rigors, and weight loss  HEENT: No earache or tinnitus  Denies hearing loss or visual disturbances  CARDIOVASCULAR: No chest pain or palpitations  RESPIRATORY: Denies any cough, hemoptysis, shortness of breath or dyspnea on exertion  GASTROINTESTINAL: As noted in the History of Present Illness  GENITOURINARY: No problems with urination  Denies any hematuria or dysuria  NEUROLOGIC: No dizziness or vertigo, denies headaches  MUSCULOSKELETAL: Denies any muscle or joint pain  SKIN: Denies skin rashes or itching  ENDOCRINE: Denies excessive thirst  Denies intolerance to heat or cold  PSYCHOSOCIAL: Denies depression or anxiety  Denies any recent memory loss         Historical Information   Past Medical History:   Diagnosis Date    Arthritis     Depression     [facility]     [facility] ([facility] ) 10/2002    Heart disease     Hypertension     Reflux esophagitis      Past Surgical History:   Procedure Laterality Date    APPENDECTOMY      CAROTID STENT       SECTION      CORONARY ANGIOPLASTY WITH STENT PLACEMENT       Social History   History   Alcohol Use    Yes     Comment: rarely     History   Drug Use No     History   Smoking Status    Former Smoker    Types: Cigarettes    Quit date:    Smokeless Tobacco    Never Used     Family History   Problem Relation Age of Onset    Diabetes Mother     Hypertension Mother     Mental illness Family         chronic    Alcohol abuse Family     Heart disease Family         cardiac disorder    Diabetes Family     Hypertension Family     Cancer Family     Diabetes Father     Aneurysm Father     Heart disease Father     Hypertension Father        Meds/Allergies       Current Outpatient Prescriptions:     aspirin 81 MG tablet    B-D UF III MINI PEN NEEDLES 31G X 5 MM MISC    clopidogrel (PLAVIX) 75 mg tablet    ezetimibe (ZETIA) 10 mg tablet    felodipine (PLENDIL) 2 5 mg 24 hr tablet    Liraglutide (VICTOZA) 18 MG/3ML SOPN    metFORMIN (GLUCOPHAGE) 1000 MG tablet    metoprolol tartrate (LOPRESSOR) 50 mg tablet    nitroglycerin (NITROSTAT) 0 4 mg SL tablet    RABEprazole (ACIPHEX) 20 MG tablet    simvastatin (ZOCOR) 80 mg tablet    traMADol (ULTRAM) 50 mg tablet    venlafaxine (EFFEXOR-XR) 75 mg 24 hr capsule    ezetimibe-simvastatin (VYTORIN) 10-80 MG per tablet    famotidine (PEPCID) 40 MG tablet    polyethylene glycol (GOLYTELY) 4000 mL solution    Allergies   Allergen Reactions    Abciximab Other (See Comments)     rcd 11/13/02    Other     Cefaclor Rash           Objective     Blood pressure 122/66, pulse 67, temperature (!) 97 °F (36 1 °C), temperature source Tympanic, height 5' 7" (1 702 m), weight 103 kg (226 lb 3 2 oz), not currently breastfeeding  Body mass index is 35 43 kg/m²  PHYSICAL EXAM:      General Appearance:   Alert, cooperative, no distress   HEENT:   Normocephalic, atraumatic, anicteric      Neck:  Supple, symmetrical, trachea midline   Lungs:   Clear to auscultation bilaterally; no rales, rhonchi or wheezing; respirations unlabored    Heart[de-identified]   Regular rate and rhythm; no murmur, rub, or gallop     Abdomen:   Soft, non-tender, non-distended; normal bowel sounds; no masses, no organomegaly    Genitalia:   Deferred    Rectal:   Deferred    Extremities:  No cyanosis, clubbing or edema    Pulses:  2+ and symmetric    Skin:  No jaundice, rashes, or lesions    Lymph nodes:  No palpable cervical lymphadenopathy        Lab Results:   No visits with results within 1 Day(s) from this visit  Latest known visit with results is:   Orders Only on 08/15/2018   Component Date Value    Total Cholesterol 08/15/2018 173     HDL 08/15/2018 47*    Triglycerides 08/15/2018 170*    LDL Direct 08/15/2018 99     Chol HDLC Ratio 08/15/2018 3 7     Non-HDL Cholesterol 08/15/2018 126     Creatinine, Urine 08/15/2018 58     Microalbum  ,U,Random 08/15/2018 0 2     Microalb/Creat Ratio 08/15/2018 3     HEP C AB 08/15/2018 NON-REACTIVE     Signal to Cut-Off 08/15/2018 0 02     Hemoglobin A1C 08/15/2018 7 6*         Radiology Results:   No results found

## 2018-09-10 NOTE — LETTER
September 10, 2018     Lauren Chrisjennifer, [address] [place] [address]    Patient: Zach Kenyon   YOB: 1955   Date of Visit: 9/10/2018       Dear Dr Harjinder Bauer: Thank you for referring Hakan Cruz to me for evaluation  Below are my notes for this consultation  If you have questions, please do not hesitate to call me  I look forward to following your patient along with you  Sincerely,        Nicho Robertson MD        CC: DO Nicho Newton MD  9/10/2018  3:32 PM  Sign at close encounter  [facility] - Outpatient Consultation  Zach Kenyon 58 y o  female MRN: [id number]  Encounter: [id number]          ASSESSMENT AND PLAN:      1  Positive fecal occult blood test  She was identified to have Hemoccult-positive without any evidence of hemorrhoids or any other cause explain this  It is potentially positive could be a false positive  EGD and colonoscopies is planned for further evaluation of this especially in setting of long-term reflux symptoms   - Case request operating room: EGD AND COLONOSCOPY; Standing  - Case request operating room: EGD AND COLONOSCOPY    2  Screen for colon cancer  - polyethylene glycol (GOLYTELY) 4000 mL solution; Take 4,000 mL by mouth once for 1 dose  Dispense: 4000 mL; Refill: 0    3  Gastroesophageal reflux disease, esophagitis presence not specified-she has had longstanding reflux symptoms last EGD was 8 years ago  Further evaluation is needed for hiatal hernia and Jaimes's esophagus  Will also try to wean her off PPI therapy and try to switch her back H2 blocker  We discuss long-term complication of PPI therapy as well  - Case request operating room: EGD AND COLONOSCOPY; Standing  - famotidine (PEPCID) 40 MG tablet; Take 1 tablet (40 mg total) by mouth daily  Dispense: 30 tablet;  Refill: 4  - Case request operating room: EGD AND COLONOSCOPY    ______________________________________________________________________    HPI:     Patient is a 70-year-old presents here for evaluation of history of Hemoccult-positive testing and longstanding reflux  She has recently gained 20 lb which is likely the cause of worsening reflux symptoms  She has not had a recent endoscopy evaluation  She has never had a colonoscopy evaluation  She has been getting fecal occult testing which was positive recently  She presents here for evaluation of this  She does have longstanding history of reflux is well controlled on AcipHex once daily  She has been on PPI therapy for several years now  She has never tried H2 blockers in the past   No other alarm symptoms at this time  She does have history of CAD recent stress test was within normal limits  She has history cardiac stents currently on Plavix which will need to be held for the procedure  Will reach out to primary care doctor so Plavix can be held  REVIEW OF SYSTEMS:    CONSTITUTIONAL: Denies any fever, chills, rigors, and weight loss  HEENT: No earache or tinnitus  Denies hearing loss or visual disturbances  CARDIOVASCULAR: No chest pain or palpitations  RESPIRATORY: Denies any cough, hemoptysis, shortness of breath or dyspnea on exertion  GASTROINTESTINAL: As noted in the History of Present Illness  GENITOURINARY: No problems with urination  Denies any hematuria or dysuria  NEUROLOGIC: No dizziness or vertigo, denies headaches  MUSCULOSKELETAL: Denies any muscle or joint pain  SKIN: Denies skin rashes or itching  ENDOCRINE: Denies excessive thirst  Denies intolerance to heat or cold  PSYCHOSOCIAL: Denies depression or anxiety  Denies any recent memory loss         Historical Information   Past Medical History:   Diagnosis Date    Arthritis     Depression     [facility]     [facility] ([facility] ) 10/2002    Heart disease     Hypertension     Reflux esophagitis      Past Surgical History:   Procedure Laterality Date    APPENDECTOMY      CAROTID STENT  2002     SECTION      CORONARY ANGIOPLASTY WITH STENT PLACEMENT       Social History   History   Alcohol Use    Yes     Comment: rarely     History   Drug Use No     History   Smoking Status    Former Smoker    Types: Cigarettes    Quit date:    Smokeless Tobacco    Never Used     Family History   Problem Relation Age of Onset    Diabetes Mother     Hypertension Mother     Mental illness Family         chronic    Alcohol abuse Family     Heart disease Family         cardiac disorder    Diabetes Family     Hypertension Family     Cancer Family     Diabetes Father     Aneurysm Father     Heart disease Father     Hypertension Father        Meds/Allergies       Current Outpatient Prescriptions:     aspirin 81 MG tablet    B-D UF III MINI PEN NEEDLES 31G X 5 MM MISC    clopidogrel (PLAVIX) 75 mg tablet    ezetimibe (ZETIA) 10 mg tablet    felodipine (PLENDIL) 2 5 mg 24 hr tablet    Liraglutide (VICTOZA) 18 MG/3ML SOPN    metFORMIN (GLUCOPHAGE) 1000 MG tablet    metoprolol tartrate (LOPRESSOR) 50 mg tablet    nitroglycerin (NITROSTAT) 0 4 mg SL tablet    RABEprazole (ACIPHEX) 20 MG tablet    simvastatin (ZOCOR) 80 mg tablet    traMADol (ULTRAM) 50 mg tablet    venlafaxine (EFFEXOR-XR) 75 mg 24 hr capsule    ezetimibe-simvastatin (VYTORIN) 10-80 MG per tablet    famotidine (PEPCID) 40 MG tablet    polyethylene glycol (GOLYTELY) 4000 mL solution    Allergies   Allergen Reactions    Abciximab Other (See Comments)     rcd 02    Other     Cefaclor Rash           Objective     Blood pressure 122/66, pulse 67, temperature (!) 97 °F (36 1 °C), temperature source Tympanic, height 5' 7" (1 702 m), weight 103 kg (226 lb 3 2 oz), not currently breastfeeding  Body mass index is 35 43 kg/m²          PHYSICAL EXAM:      General Appearance:   Alert, cooperative, no distress   HEENT:   Normocephalic, atraumatic, anicteric      Neck:  Supple, symmetrical, trachea midline   Lungs:   Clear to auscultation bilaterally; no rales, rhonchi or wheezing; respirations unlabored    Heart[de-identified]   Regular rate and rhythm; no murmur, rub, or gallop  Abdomen:   Soft, non-tender, non-distended; normal bowel sounds; no masses, no organomegaly    Genitalia:   Deferred    Rectal:   Deferred    Extremities:  No cyanosis, clubbing or edema    Pulses:  2+ and symmetric    Skin:  No jaundice, rashes, or lesions    Lymph nodes:  No palpable cervical lymphadenopathy        Lab Results:   No visits with results within 1 Day(s) from this visit  Latest known visit with results is:   Orders Only on 08/15/2018   Component Date Value    Total Cholesterol 08/15/2018 173     HDL 08/15/2018 47*    Triglycerides 08/15/2018 170*    LDL Direct 08/15/2018 99     Chol HDLC Ratio 08/15/2018 3 7     Non-HDL Cholesterol 08/15/2018 126     Creatinine, Urine 08/15/2018 58     Microalbum  ,U,Random 08/15/2018 0 2     Microalb/Creat Ratio 08/15/2018 3     HEP C AB 08/15/2018 NON-REACTIVE     Signal to Cut-Off 08/15/2018 0 02     Hemoglobin A1C 08/15/2018 7 6*         Radiology Results:   No results found

## 2018-09-26 DIAGNOSIS — E11.9 TYPE 2 DIABETES MELLITUS WITHOUT COMPLICATION, WITHOUT LONG-TERM CURRENT USE OF INSULIN (HCC): ICD-10-CM

## 2018-09-26 RX ORDER — LIRAGLUTIDE 6 MG/ML
INJECTION SUBCUTANEOUS
Qty: 27 ML | Refills: 0 | Status: SHIPPED | OUTPATIENT
Start: 2018-09-26 | End: 2019-02-18 | Stop reason: SDUPTHER

## 2018-10-08 DIAGNOSIS — E11.9 TYPE 2 DIABETES MELLITUS WITHOUT COMPLICATION, WITHOUT LONG-TERM CURRENT USE OF INSULIN (HCC): ICD-10-CM

## 2018-10-11 LAB
LEFT EYE DIABETIC RETINOPATHY: NORMAL
RIGHT EYE DIABETIC RETINOPATHY: NORMAL

## 2018-10-11 PROCEDURE — 3072F LOW RISK FOR RETINOPATHY: CPT | Performed by: FAMILY MEDICINE

## 2018-10-16 ENCOUNTER — ANESTHESIA EVENT (OUTPATIENT)
Dept: GASTROENTEROLOGY | Facility: MEDICAL CENTER | Age: 63
End: 2018-10-16
Payer: COMMERCIAL

## 2018-10-17 ENCOUNTER — ANESTHESIA (OUTPATIENT)
Dept: GASTROENTEROLOGY | Facility: MEDICAL CENTER | Age: 63
End: 2018-10-17
Payer: COMMERCIAL

## 2018-10-17 ENCOUNTER — HOSPITAL ENCOUNTER (OUTPATIENT)
Facility: MEDICAL CENTER | Age: 63
Setting detail: OUTPATIENT SURGERY
Discharge: HOME | End: 2018-10-17
Attending: INTERNAL MEDICINE | Admitting: INTERNAL MEDICINE
Payer: COMMERCIAL

## 2018-10-17 VITALS
RESPIRATION RATE: 16 BRPM | HEIGHT: 67 IN | DIASTOLIC BLOOD PRESSURE: 75 MMHG | WEIGHT: 216 LBS | SYSTOLIC BLOOD PRESSURE: 151 MMHG | HEART RATE: 69 BPM | BODY MASS INDEX: 33.9 KG/M2 | TEMPERATURE: 97.2 F | OXYGEN SATURATION: 100 %

## 2018-10-17 DIAGNOSIS — K21.9 GASTROESOPHAGEAL REFLUX DISEASE, ESOPHAGITIS PRESENCE NOT SPECIFIED: ICD-10-CM

## 2018-10-17 DIAGNOSIS — R19.5 POSITIVE FECAL OCCULT BLOOD TEST: ICD-10-CM

## 2018-10-17 PROCEDURE — C1726 CATH, BAL DIL, NON-VASCULAR: HCPCS | Performed by: INTERNAL MEDICINE

## 2018-10-17 PROCEDURE — 45380 COLONOSCOPY AND BIOPSY: CPT | Performed by: INTERNAL MEDICINE

## 2018-10-17 PROCEDURE — 88305 TISSUE EXAM BY PATHOLOGIST: CPT | Performed by: PATHOLOGY

## 2018-10-17 PROCEDURE — 43239 EGD BIOPSY SINGLE/MULTIPLE: CPT | Performed by: INTERNAL MEDICINE

## 2018-10-17 PROCEDURE — 45385 COLONOSCOPY W/LESION REMOVAL: CPT | Performed by: INTERNAL MEDICINE

## 2018-10-17 RX ORDER — SODIUM CHLORIDE 9 MG/ML
125 INJECTION, SOLUTION INTRAVENOUS CONTINUOUS
Status: DISCONTINUED | OUTPATIENT
Start: 2018-10-17 | End: 2018-10-17 | Stop reason: HOSPADM

## 2018-10-17 RX ORDER — PROPOFOL 10 MG/ML
INJECTION, EMULSION INTRAVENOUS AS NEEDED
Status: DISCONTINUED | OUTPATIENT
Start: 2018-10-17 | End: 2018-10-17 | Stop reason: SURG

## 2018-10-17 RX ORDER — LIDOCAINE HYDROCHLORIDE 20 MG/ML
INJECTION, SOLUTION EPIDURAL; INFILTRATION; INTRACAUDAL; PERINEURAL AS NEEDED
Status: DISCONTINUED | OUTPATIENT
Start: 2018-10-17 | End: 2018-10-17 | Stop reason: SURG

## 2018-10-17 RX ADMIN — PROPOFOL 30 MG: 10 INJECTION, EMULSION INTRAVENOUS at 07:37

## 2018-10-17 RX ADMIN — PROPOFOL 40 MG: 10 INJECTION, EMULSION INTRAVENOUS at 07:36

## 2018-10-17 RX ADMIN — PROPOFOL 50 MG: 10 INJECTION, EMULSION INTRAVENOUS at 07:33

## 2018-10-17 RX ADMIN — PROPOFOL 50 MG: 10 INJECTION, EMULSION INTRAVENOUS at 07:39

## 2018-10-17 RX ADMIN — PROPOFOL 150 MG: 10 INJECTION, EMULSION INTRAVENOUS at 07:32

## 2018-10-17 RX ADMIN — PROPOFOL 50 MG: 10 INJECTION, EMULSION INTRAVENOUS at 07:46

## 2018-10-17 RX ADMIN — PROPOFOL 40 MG: 10 INJECTION, EMULSION INTRAVENOUS at 07:45

## 2018-10-17 RX ADMIN — LIDOCAINE HYDROCHLORIDE 3 ML: 20 INJECTION, SOLUTION EPIDURAL; INFILTRATION; INTRACAUDAL; PERINEURAL at 07:32

## 2018-10-17 RX ADMIN — PROPOFOL 30 MG: 10 INJECTION, EMULSION INTRAVENOUS at 07:42

## 2018-10-17 RX ADMIN — PROPOFOL 30 MG: 10 INJECTION, EMULSION INTRAVENOUS at 07:43

## 2018-10-17 RX ADMIN — PROPOFOL 40 MG: 10 INJECTION, EMULSION INTRAVENOUS at 07:56

## 2018-10-17 RX ADMIN — PROPOFOL 50 MG: 10 INJECTION, EMULSION INTRAVENOUS at 07:48

## 2018-10-17 RX ADMIN — SODIUM CHLORIDE 125 ML/HR: 0.9 INJECTION, SOLUTION INTRAVENOUS at 07:21

## 2018-10-17 RX ADMIN — PROPOFOL 30 MG: 10 INJECTION, EMULSION INTRAVENOUS at 07:53

## 2018-10-17 RX ADMIN — PROPOFOL 50 MG: 10 INJECTION, EMULSION INTRAVENOUS at 07:41

## 2018-10-17 RX ADMIN — PROPOFOL 30 MG: 10 INJECTION, EMULSION INTRAVENOUS at 07:44

## 2018-10-17 RX ADMIN — PROPOFOL 30 MG: 10 INJECTION, EMULSION INTRAVENOUS at 07:35

## 2018-10-17 NOTE — H&P
History and Physical -  [facility]  Rhonda Dickerson 58 y o  female MRN: [id number]                  HPI: Rhonda Dickerson is a 58y o  year old female who presents for EGD and colonoscopy evaluation for heme-positive stool  REVIEW OF SYSTEMS: Per the HPI, and otherwise unremarkable      Historical Information   Past Medical History:   Diagnosis Date    Arthritis     back    CREST syndrome ([facility] )     Depression     Diabetes mellitus ([facility] )     H/O blood clots     Heart attack ([facility] ) 10/2002    Heart disease     Hyperlipidemia     Hypertension     Reflux esophagitis     Scleroderma (HCC)      Past Surgical History:   Procedure Laterality Date    APPENDECTOMY       SECTION      CORONARY ANGIOPLASTY WITH STENT PLACEMENT       Social History   History   Alcohol Use    Yes     Comment: socially     History   Drug Use No     History   Smoking Status    Former Smoker    Types: Cigarettes    Quit date:    Smokeless Tobacco    Never Used     Family History   Problem Relation Age of Onset    Diabetes Mother     Hypertension Mother     Mental illness Family         chronic    Alcohol abuse Family     Heart disease Family         cardiac disorder    Diabetes Family     Hypertension Family     Cancer Family     Diabetes Father     Aneurysm Father     Heart disease Father     Hypertension Father        Meds/Allergies     Prescriptions Prior to Admission   Medication    aspirin 81 MG tablet    famotidine (PEPCID) 40 MG tablet    felodipine (PLENDIL) 2 5 mg 24 hr tablet    metFORMIN (GLUCOPHAGE) 1000 MG tablet    metoprolol tartrate (LOPRESSOR) 50 mg tablet    RABEprazole (ACIPHEX) 20 MG tablet    traMADol (ULTRAM) 50 mg tablet    venlafaxine (EFFEXOR-XR) 75 mg 24 hr capsule    VICTOZA injection    B-D UF III MINI PEN NEEDLES 31G X 5 MM MISC    clopidogrel (PLAVIX) 75 mg tablet    ezetimibe (ZETIA) 10 mg tablet    ezetimibe-simvastatin (VYTORIN) 10-80 MG per tablet  metFORMIN (GLUCOPHAGE) 1000 MG tablet    nitroglycerin (NITROSTAT) 0 4 mg SL tablet    polyethylene glycol (GOLYTELY) 4000 mL solution    simvastatin (ZOCOR) 80 mg tablet       Allergies   Allergen Reactions    Abciximab Other (See Comments)     rcd 11/13/02    Other     Cefaclor Rash       Objective     Blood pressure (!) 173/79, pulse 71, temperature (!) 97 2 °F (36 2 °C), temperature source Temporal, resp  rate 16, height 5' 7" (1 702 m), weight 98 kg (216 lb), SpO2 98 %, not currently breastfeeding  PHYSICAL EXAM    Gen: NAD  CV: RRR  CHEST: Clear  ABD: soft, NT/ND  EXT: no edema      ASSESSMENT/PLAN:  This is a 58y o  year old female here for EGD and colonoscopy, and she is stable and optimized for her procedure

## 2018-10-17 NOTE — DISCHARGE INSTRUCTIONS
Upper Endoscopy   WHAT YOU NEED TO KNOW:   An upper endoscopy is also called an upper gastrointestinal (GI) endoscopy, or an esophagogastroduodenoscopy (EGD)  You may feel bloated, gassy, or have some abdominal discomfort after your procedure  Your throat may be sore for 24 to 36 hours  You may burp or pass gas from air that is still inside your body  DISCHARGE INSTRUCTIONS:   Call 911 for any of the following:   · You have sudden chest pain or trouble breathing  Seek care immediately if:   · You feel dizzy or faint  · You have trouble swallowing  · Your bowel movements are very dark or black  · Your abdomen is hard and firm and you have severe pain  · You vomit blood  Contact your healthcare provider if:   · You feel full or bloated and cannot burp or pass gas  · You have not had a bowel movement for 3 days after your procedure  · You have neck pain  · You have a fever or chills  · You have nausea or are vomiting  · You have a rash or hives  · You have questions or concerns about your endoscopy  Relieve a sore throat:  Suck on throat lozenges or crushed ice  Gargle with a small amount of warm salt water  Mix 1 teaspoon of salt and 1 cup of warm water to make salt water  Relieve gas and discomfort from bloating:  Lie on your right side with a heating pad on your abdomen  Take short walks to help pass gas  Eat small meals until bloating is relieved  Rest after your procedure: You have been given medicine to relax you  Do not  drive or make important decisions until the day after your procedure  Return to your normal activity as directed  You can usually return to work the day after your procedure  Follow up with your healthcare provider as directed:  Write down your questions so you remember to ask them during your visits     © 2017 [address] is for End User's use only and may not be sold, redistributed or otherwise used for commercial purposes  All illustrations and images included in CareNotes® are the copyrighted property of [employer] M [employer]  or Rakan Gabriel  The above information is an  only  It is not intended as medical advice for individual conditions or treatments  Talk to your doctor, nurse or pharmacist before following any medical regimen to see if it is safe and effective for you  Colonoscopy   WHAT YOU NEED TO KNOW:   A colonoscopy is a procedure to examine the inside of your colon (intestine) with a scope  Polyps or tissue growths may have been removed during your colonoscopy  It is normal to feel bloated and to have some abdominal discomfort  You should be passing gas  If you have hemorrhoids or you had polyps removed, you may have a small amount of bleeding  DISCHARGE INSTRUCTIONS:   Seek care immediately if:   · You have a large amount of bright red blood in your bowel movements  · Your abdomen is hard and firm and you have severe pain  · You have sudden trouble breathing  Contact your healthcare provider if:   · You develop a rash or hives  · You have a fever within 24 hours of your procedure  · You have not had a bowel movement for 3 days after your procedure  · You have questions or concerns about your condition or care  Activity:   · Do not lift, strain, or run  for 3 days after your procedure  · Rest after your procedure  You have been given medicine to relax you  Do not  drive or make important decisions until the day after your procedure  Return to your normal activity as directed  · Relieve gas and discomfort from bloating  by lying on your right side with a heating pad on your abdomen  You may need to take short walks to help the gas move out  Eat small meals until bloating is relieved  If you had polyps removed: For 7 days after your procedure:  · Do not  take aspirin  · Do not  go on long car rides    Help prevent constipation:   · Eat a variety of healthy foods  Healthy foods include fruit, vegetables, whole-grain breads, low-fat dairy products, beans, lean meat, and fish  Ask if you need to be on a special diet  Your healthcare provider may recommend that you eat high-fiber foods such as cooked beans  Fiber helps you have regular bowel movements  · Drink liquids as directed  Adults should drink between 9 and 13 eight-ounce cups of liquid every day  Ask what amount is best for you  For most people, good liquids to drink are water, juice, and milk  · Exercise as directed  Talk to your healthcare provider about the best exercise plan for you  Exercise can help prevent constipation, decrease your blood pressure and improve your health  Follow up with your healthcare provider as directed:  Write down your questions so you remember to ask them during your visits  © 2017 [address] Information is for End User's use only and may not be sold, redistributed or otherwise used for commercial purposes  All illustrations and images included in CareNotes® are the copyrighted property of A D A M , Inc  or Rakan Gabriel  The above information is an  only  It is not intended as medical advice for individual conditions or treatments  Talk to your doctor, nurse or pharmacist before following any medical regimen to see if it is safe and effective for you  Colorectal Polyps   WHAT YOU NEED TO KNOW:   What are colorectal polyps? Colorectal polyps are small growths of tissue in the lining of the colon and rectum  Most polyps are hyperplastic polyps and are usually benign (noncancerous)  Certain types of polyps, called adenomatous polyps, may turn into cancer  What increases my risk of colorectal polyps? The exact cause of colorectal polyps is unknown   The following may increase your risk:  · Older age    · A diet of foods high in fat and low in fiber     · Family history of polyps    · Intestinal diseases, such as Crohn's disease or ulcerative colitis    · An unhealthy lifestyle, such as physical inactivity, smoking, or drinking alcohol    · Obesity  What are the signs and symptoms of colorectal polyps? · Blood in your bowel movement or bleeding from the rectum    · Change in bowel movement habits, such as diarrhea and constipation    · Abdominal pain  How are colorectal polyps diagnosed? You should have fecal blood screening once a year for colorectal disease if you are over 48years old  You should be screened earlier if you have an intestinal disease or a family history of polyps or colorectal cancer  During this screening, a sample of your bowel movement is checked for blood, which may be an early sign of colorectal polyps or cancer  You may also need any of the following tests:  · Digital rectal exam:  Your healthcare provider will examine your anus and use a finger to check your rectum for polyps  · Barium enema: A barium enema is an x-ray of the colon  A tube is put into your anus, and a liquid called barium is put through the tube  Barium is used so that caregivers can see your colon better on the x-ray film  · Virtual colonoscopy: This is a CT scan that takes pictures of the inside of your colon and rectum  A small, flexible tube is put into your rectum and air or carbon dioxide (gas) is used to expand your colon  This lets healthcare providers clearly see your colon and any polyps on a monitor  · Colonoscopy or sigmoidoscopy: These procedures help your healthcare provider see the inside of your colon using a flexible tube with a small light and camera on the end  During a sigmoidoscopy, your healthcare provider will only look at rectum and lower colon  During a colonoscopy, healthcare providers will look at the full length of your colon  Healthcare providers may remove a small amount of tissue from the colon for a biopsy  How are colorectal polyps treated?    · Polyp removal:  Polyps may be removed during your sigmoidoscopy or colonoscopy  · Polypectomy: This is surgery to remove your polyps  You may need laparoscopic or open surgery, depending on the type, size, and number of polyps that you have  Laparoscopy is done by inserting a small, flexible scope into incisions made on your abdomen  Open surgery is done by making a larger incision on your abdomen   What are the risks of colorectal polyps? You may bleed during a colonoscopy procedure  Your bowel may be perforated (torn) when polyps are removed  This may lead to an open abdominal surgery  During surgery, you may bleed too much or get an infection  Adenomatous polyps that are not removed may turn into cancer and become more difficult to treat  Where can I find support and more information? · Abrahan Martin ([facility])  [address], [place] [address]  Phone: [phone]  Web Address: Jack Adams  [facility] [address]  When should I contact my healthcare provider? · You have a fever  · You have chills, a cough, or feel weak and achy  · You have abdominal pain that does not go away or gets worse after you take medicine  · Your abdomen is swollen  · You are losing weight without trying  · You have questions or concerns about your condition or care  When should I seek immediate care or call 911? · You have sudden shortness of breath  · You have a fast heart rate, fast breathing, or are too dizzy to stand up  · You have severe abdominal pain  · You see blood in your bowel movement  CARE AGREEMENT:   You have the right to help plan your care  Learn about your health condition and how it may be treated  Discuss treatment options with your caregivers to decide what care you want to receive  You always have the right to refuse treatment  The above information is an  only  It is not intended as medical advice for individual conditions or treatments  Talk to your doctor, nurse or pharmacist before following any medical regimen to see if it is safe and effective for you  © 2017 [address] Information is for End User's use only and may not be sold, redistributed or otherwise used for commercial purposes  All illustrations and images included in CareNotes® are the copyrighted property of A D A M , Inc  or Rakan Gabriel  Gastric Polyps   WHAT YOU NEED TO KNOW:   What are gastric polyps? Gastric polyps are growths that form in the lining of your stomach  They are not cancerous, but certain types of polyps can change into cancer  What puts me at risk for gastric polyps? · Chronic gastritis caused by NSAIDs use or ulcers    · Long-term use of proton pump inhibitor medicines (used to decrease stomach acid)    · An infection in your stomach caused by H  pylori bacteria  What are the symptoms of gastric polyps? You may have no symptoms  Large polyps may cause any of the following:  · Abdominal pain    · Indigestion    · Vomiting after meals or vomiting blood    · Dark or bloody bowel movements  How are gastric polyps diagnosed? Gastric polyps are usually found during an endoscopy for another reason  All or part of the polyp will be removed during the test  Your healthcare provider may also remove tissue from your stomach  The polyps and tissue are sent to the lab for testing  How are gastric polyps treated? Some types of polyps go away on their own  Other types may be removed if they are large, you have symptoms, or abnormal cells are found  Large polyps and abnormal cells increase your risk for cancer  You may also need antibiotics if you have an infection caused by H  pylori bacteria  Part of your stomach may be removed if the polyps cannot be removed and abnormal cells are found  When should I seek immediate care? · You have blood in your vomit  · You have dark or bloody bowel movements      · You have severe pain in your abdomen that does not go away after you take medicine  When should I contact my healthcare provider? · You have indigestion that does not go away with treatment  · You vomit after meals  · You have questions or concerns about your condition or care  CARE AGREEMENT:   You have the right to help plan your care  Learn about your health condition and how it may be treated  Discuss treatment options with your caregivers to decide what care you want to receive  You always have the right to refuse treatment  The above information is an  only  It is not intended as medical advice for individual conditions or treatments  Talk to your doctor, nurse or pharmacist before following any medical regimen to see if it is safe and effective for you  © 2017 [address] Information is for End User's use only and may not be sold, redistributed or otherwise used for commercial purposes  All illustrations and images included in CareNotes® are the copyrighted property of A D A M , Inc  or Rakan Gbariel

## 2018-10-17 NOTE — ANESTHESIA PREPROCEDURE EVALUATION
Review of Systems/Medical History  Patient summary reviewed        Cardiovascular  Exercise tolerance (METS): >4,  Hyperlipidemia, Hypertension controlled, Past MI (16 years ago, s/p 2 stents) ,    Pulmonary  Negative pulmonary ROS Smoker ex-smoker  ,        GI/Hepatic    GERD well controlled, Bowel prep       Negative  ROS        Endo/Other  Diabetes well controlled type 2 Oral agent,      GYN  Negative gynecology ROS          Hematology  Negative hematology ROS      Musculoskeletal    Arthritis     Neurology  Negative neurology ROS      Psychology   Anxiety, Depression , being treated for depression,              Physical Exam    Airway    Mallampati score: II  TM Distance: >3 FB  Neck ROM: full     Dental   No notable dental hx     Cardiovascular  Rhythm: regular, Rate: normal,     Pulmonary  Breath sounds clear to auscultation,     Other Findings        Anesthesia Plan  ASA Score- 2     Anesthesia Type- IV sedation with anesthesia with ASA Monitors  Additional Monitors:   Airway Plan:         Plan Factors-Patient not instructed to abstain from smoking on day of procedure  Patient did not smoke on day of surgery  Induction- intravenous  Postoperative Plan-     Informed Consent- Anesthetic plan and risks discussed with patient

## 2018-10-17 NOTE — OP NOTE
OPERATIVE REPORT  PATIENT NAME: Pippa Ojeda    :  1955  MRN: [id number]  Pt Location: [facility] GI ROOM 01    SURGERY DATE: 10/17/2018    Surgeon(s) and Role:     * Elenita Rawls MD - Primary    Preop Diagnosis:  Positive fecal occult blood test [R19 5]  Gastroesophageal reflux disease, esophagitis presence not specified [K21 9]    Post-Op Diagnosis Codes:     * Positive fecal occult blood test [R19 5]     * Gastroesophageal reflux disease, esophagitis presence not specified [K21 9]    Procedure(s) (LRB):  EGD AND COLONOSCOPY (N/A)    Specimen(s):  ID Type Source Tests Collected by Time Destination   1 : gastric, cold snare Tissue Polyp, Stomach/Small Intestine TISSUE EXAM Elenita Rawls MD 10/17/2018 2500    2 : cecal polyp, hot snare Tissue Polyp, Colorectal TISSUE EXAM Elenita Rawls MD 10/17/2018 0755    3 : descending, cold snare Tissue Polyp, Colorectal TISSUE EXAM Elenita Rawls MD 10/17/2018 0757    4 : recto-sig, cold bx Tissue Polyp, Colorectal TISSUE EXAM Elenita Rawls MD 10/17/2018 0759        ESOPHAGOGASTRODUODENOSCOPY    PROCEDURE: EGD    SEDATION: Monitored anesthesia care, check anesthesia records    ASA Class: 2    INDICATIONS:  Heme-positive stool    CONSENT:  Informed consent was obtained for the procedure, including sedation after explaining the risks and benefits of the procedure  Risks including but not limited to bleeding, perforation, infection, and missed lesion  PREPARATION:   Telemetry, pulse oximetry, blood pressure were monitored throughout the procedure  Patient was identified by myself both verbally and by visual inspection of ID band  DESCRIPTION:   Patient was placed in the left lateral decubitus position and was sedated with the above medication  The gastroscope was introduced in to the oropharynx and the esophagus was intubated under direct visualization  Scope was passed down the esophagus up to 2nd part of the duodenum   A careful inspection was made as the gastroscope was withdrawn, including a retroflexed view of the stomach; findings and interventions are described below  FINDINGS:    #1  Esophagus- nonobstructive Schatzki's ring status post dilation with balloon 20 mm balloon was used  Otherwise esophagus was normal  #2  Stomach-benign-appearing polyp seen in the body of the stomach measuring 6 mm removed by cold snare polypectomy  #3  Duodenum-normal         IMPRESSIONS:    Nonobstructive Schatzki's ring status post dilation  Benign appearing gastric polyps removed by cold snare polypectomy    RECOMMENDATIONS:     Follow-up biopsy results  Can restart Plavix  Continue famotidine    COMPLICATIONS:  None; patient tolerated the procedure well            DISPOSITION: PACU           CONDITION: Stable      SIGNATURE: Norbert Bautista MD  DATE: October 17, 2018  TIME: 8:18 AM

## 2018-10-17 NOTE — DISCHARGE INSTR - AVS FIRST PAGE
OPERATIVE REPORT  PATIENT NAME: Hiren Peralta    :  1955  MRN: [id number]  Pt Location: [facility] GI ROOM 01    SURGERY DATE: 10/17/2018    Surgeon(s) and Role:     * Rafael Yeboah MD - Primary    Preop Diagnosis:  Positive fecal occult blood test [R19 5]  Gastroesophageal reflux disease, esophagitis presence not specified [K21 9]    Post-Op Diagnosis Codes:     * Positive fecal occult blood test [R19 5]     * Gastroesophageal reflux disease, esophagitis presence not specified [K21 9]    Procedure(s) (LRB):  EGD AND COLONOSCOPY (N/A)    Specimen(s):  ID Type Source Tests Collected by Time Destination  1 : gastric, cold snare Tissue Polyp, Stomach/Small Intestine TISSUE EXAM Rafael Yeboah MD 10/17/2018 0655   2 : cecal polyp, hot snare Tissue Polyp, Colorectal TISSUE EXAM Rafael Yeboah MD 10/17/2018 0755   3 : descending, cold snare Tissue Polyp, Colorectal TISSUE EXAM Rafael Yeboah MD 10/17/2018 0757   4 : recto-sig, cold bx Tissue Polyp, Colorectal TISSUE EXAM Rafael Yeboah MD 10/17/2018 0759       ESOPHAGOGASTRODUODENOSCOPY    PROCEDURE: EGD    SEDATION: Monitored anesthesia care, check anesthesia records    ASA Class: 2    INDICATIONS:  Heme-positive stool    CONSENT:  Informed consent was obtained for the procedure, including sedation after explaining the risks and benefits of the procedure  Risks including but not limited to bleeding, perforation, infection, and missed lesion  PREPARATION:   Telemetry, pulse oximetry, blood pressure were monitored throughout the procedure  Patient was identified by myself both verbally and by visual inspection of ID band  DESCRIPTION:   Patient was placed in the left lateral decubitus position and was sedated with the above medication  The gastroscope was introduced in to the oropharynx and the esophagus was intubated under direct visualization  Scope was passed down the esophagus up to 2nd part of the duodenum   A careful inspection was made as the gastroscope was withdrawn, including a retroflexed view of the stomach; findings and interventions are described below  FINDINGS:    #1  Esophagus- nonobstructive Schatzki's ring status post dilation with balloon 20 mm balloon was used  Otherwise esophagus was normal  #2  Stomach-benign-appearing polyp seen in the body of the stomach measuring 6 mm removed by cold snare polypectomy  #3  Duodenum-normal         IMPRESSIONS:    Nonobstructive Schatzki's ring status post dilation  Benign appearing gastric polyps removed by cold snare polypectomy    RECOMMENDATIONS:     Follow-up biopsy results  Can restart Plavix  Continue famotidine    COMPLICATIONS:  None; patient tolerated the procedure well            DISPOSITION: PACU           CONDITION: Stable      SIGNATURE: Linette Santana MD  DATE: 2018  TIME: 8:18 AM      OPERATIVE REPORT  PATIENT NAME: Jacob Jeffries    :  1955  MRN: [id number]  Pt Location: [facility] GI ROOM 01    SURGERY DATE: 10/17/2018    Surgeon(s) and Role:     * Linette Santana MD - Primary    Preop Diagnosis:  Positive fecal occult blood test [R19 5]  Gastroesophageal reflux disease, esophagitis presence not specified [K21 9]    Post-Op Diagnosis Codes:     * Positive fecal occult blood test [R19 5]     * Gastroesophageal reflux disease, esophagitis presence not specified [K21 9]    Procedure(s) (LRB):  EGD AND COLONOSCOPY (N/A)    Specimen(s):  ID Type Source Tests Collected by Time Destination  1 : gastric, cold snare Tissue Polyp, Stomach/Small Intestine TISSUE EXAM Linette Santana MD 10/17/2018 9334   2 : cecal polyp, hot snare Tissue Polyp, Colorectal TISSUE EXAM Linette Santana MD 10/17/2018 0755   3 : descending, cold snare Tissue Polyp, Colorectal TISSUE EXAM Linette Santana MD 10/17/2018 0757   4 : recto-sig, cold bx Tissue Polyp, Colorectal TISSUE EXAM Linette Santana MD 10/17/2018 0759     Colonoscopy Procedure Note    Procedure: Colonoscopy    Sedation: Monitored anesthesia care, check anesthesia records      ASA Class: 2    INDICATIONS:  Heme-positive stool    POST-OP DIAGNOSIS: See the impression below    Procedure Details     Prior colonoscopy: No prior colonoscopy  Informed consent was obtained for the procedure, including sedation  Risks of perforation, hemorrhage, adverse drug reaction and aspiration were discussed  The patient was placed in the left lateral decubitus position  Based on the pre-procedure assessment, including review of the patient's medical history, medications, allergies, and review of systems, she had been deemed to be an appropriate candidate for conscious sedation; she was therefore sedated with the medications listed below  The patient was monitored continuously with telemetry, pulse oximetry, blood pressure monitoring, and direct observations  A rectal examination was performed  The colonoscope was inserted into the rectum and advanced under direct vision to the cecum, which was identified by the ileocecal valve and appendiceal orifice  The quality of the colonic preparation was excellent  A careful inspection was made as the colonoscope was withdrawn, including a retroflexed view of the rectum; findings and interventions are described below  Findings:  10 mm colonic polyp seen in the cecum status post snare cautery polypectomy with clip placed at the site of polypectomy to prevent post polypectomy bleeding  6 mm polyp seen in descending colon removed by cold snare polypectomy  3 mm polyp seen in the rectosigmoid area removed by biopsy polypectomy  Diminutive internal hemorrhoids           Complications: None; patient tolerated the procedure well  Impression:    Multiple colonic polyps removed  Due to internal hemorrhoids    Recommendations:  Repeat colonoscopy in 3 years if polyp is an adenoma      SIGNATURE: Ronda Karimi MD  DATE: October 17, 2018  TIME: 8:20 AM

## 2018-10-17 NOTE — OP NOTE
OPERATIVE REPORT  PATIENT NAME: Candelaria Armas    :  1955  MRN: [id number]  Pt Location: [facility] GI ROOM 01    SURGERY DATE: 10/17/2018    Surgeon(s) and Role:     * Sobeida Gonzales MD - Primary    Preop Diagnosis:  Positive fecal occult blood test [R19 5]  Gastroesophageal reflux disease, esophagitis presence not specified [K21 9]    Post-Op Diagnosis Codes:     * Positive fecal occult blood test [R19 5]     * Gastroesophageal reflux disease, esophagitis presence not specified [K21 9]    Procedure(s) (LRB):  EGD AND COLONOSCOPY (N/A)    Specimen(s):  ID Type Source Tests Collected by Time Destination   1 : gastric, cold snare Tissue Polyp, Stomach/Small Intestine TISSUE EXAM Sobeida Gonzales MD 10/17/2018 2190    2 : cecal polyp, hot snare Tissue Polyp, Colorectal TISSUE EXAM Sobeida Gonzales MD 10/17/2018 0755    3 : descending, cold snare Tissue Polyp, Colorectal TISSUE EXAM Sobeida Gonzales MD 10/17/2018 0757    4 : recto-sig, cold bx Tissue Polyp, Colorectal TISSUE EXAM Sobeida Gonzales MD 10/17/2018 0759      Colonoscopy Procedure Note    Procedure: Colonoscopy    Sedation: Monitored anesthesia care, check anesthesia records      ASA Class: 2    INDICATIONS:  Heme-positive stool    POST-OP DIAGNOSIS: See the impression below    Procedure Details     Prior colonoscopy: No prior colonoscopy  Informed consent was obtained for the procedure, including sedation  Risks of perforation, hemorrhage, adverse drug reaction and aspiration were discussed  The patient was placed in the left lateral decubitus position  Based on the pre-procedure assessment, including review of the patient's medical history, medications, allergies, and review of systems, she had been deemed to be an appropriate candidate for conscious sedation; she was therefore sedated with the medications listed below  The patient was monitored continuously with telemetry, pulse oximetry, blood pressure monitoring, and direct observations        A rectal examination was performed  The colonoscope was inserted into the rectum and advanced under direct vision to the cecum, which was identified by the ileocecal valve and appendiceal orifice  The quality of the colonic preparation was excellent  A careful inspection was made as the colonoscope was withdrawn, including a retroflexed view of the rectum; findings and interventions are described below  Findings:  10 mm colonic polyp seen in the cecum status post snare cautery polypectomy with clip placed at the site of polypectomy to prevent post polypectomy bleeding  6 mm polyp seen in descending colon removed by cold snare polypectomy  3 mm polyp seen in the rectosigmoid area removed by biopsy polypectomy  Diminutive internal hemorrhoids           Complications: None; patient tolerated the procedure well  Impression:    Multiple colonic polyps removed  Due to internal hemorrhoids    Recommendations:  Repeat colonoscopy in 3 years if polyp is an adenoma      SIGNATURE: Alix Hernandez MD  DATE: October 17, 2018  TIME: 8:20 AM

## 2018-11-20 DIAGNOSIS — E11.9 TYPE 2 DIABETES MELLITUS WITHOUT COMPLICATION, WITH LONG-TERM CURRENT USE OF INSULIN (HCC): ICD-10-CM

## 2018-11-20 DIAGNOSIS — E11.9 TYPE 2 DIABETES MELLITUS WITHOUT COMPLICATION, WITHOUT LONG-TERM CURRENT USE OF INSULIN (HCC): ICD-10-CM

## 2018-11-20 DIAGNOSIS — Z79.4 TYPE 2 DIABETES MELLITUS WITHOUT COMPLICATION, WITH LONG-TERM CURRENT USE OF INSULIN (HCC): ICD-10-CM

## 2018-11-20 RX ORDER — PEN NEEDLE, DIABETIC 31 GX5/16"
1 NEEDLE, DISPOSABLE MISCELLANEOUS DAILY
Qty: 100 EACH | Refills: 0 | Status: SHIPPED | OUTPATIENT
Start: 2018-11-20 | End: 2018-11-29 | Stop reason: SDUPTHER

## 2018-11-25 DIAGNOSIS — E11.9 TYPE 2 DIABETES MELLITUS WITHOUT COMPLICATION, WITHOUT LONG-TERM CURRENT USE OF INSULIN (HCC): ICD-10-CM

## 2018-11-26 RX ORDER — PEN NEEDLE, DIABETIC 31 GX5/16"
1 NEEDLE, DISPOSABLE MISCELLANEOUS DAILY
Refills: 0 | OUTPATIENT
Start: 2018-11-26

## 2018-11-29 DIAGNOSIS — Z79.4 TYPE 2 DIABETES MELLITUS WITHOUT COMPLICATION, WITH LONG-TERM CURRENT USE OF INSULIN (HCC): ICD-10-CM

## 2018-11-29 DIAGNOSIS — E11.9 TYPE 2 DIABETES MELLITUS WITHOUT COMPLICATION, WITH LONG-TERM CURRENT USE OF INSULIN (HCC): ICD-10-CM

## 2018-11-29 RX ORDER — PEN NEEDLE, DIABETIC 31 GX5/16"
1 NEEDLE, DISPOSABLE MISCELLANEOUS DAILY
Qty: 100 EACH | Refills: 0 | Status: SHIPPED | OUTPATIENT
Start: 2018-11-29 | End: 2019-02-20 | Stop reason: SDUPTHER

## 2018-11-29 NOTE — TELEPHONE ENCOUNTER
Pt called Rx line requesting med refill for her BD UF III pen needles 31G X 5 MM to be sent to mail order [facility]   Pt last ov 6/27/18

## 2018-12-05 DIAGNOSIS — E78.2 MIXED HYPERLIPIDEMIA: Primary | ICD-10-CM

## 2018-12-05 DIAGNOSIS — E78.5 HYPERLIPIDEMIA, UNSPECIFIED HYPERLIPIDEMIA TYPE: ICD-10-CM

## 2018-12-05 RX ORDER — EZETIMIBE 10 MG/1
10 TABLET ORAL DAILY
Qty: 90 TABLET | Refills: 1 | Status: SHIPPED | OUTPATIENT
Start: 2018-12-05 | End: 2019-04-24 | Stop reason: SDUPTHER

## 2018-12-07 DIAGNOSIS — E78.2 MIXED HYPERLIPIDEMIA: Primary | ICD-10-CM

## 2018-12-07 DIAGNOSIS — E78.5 HYPERLIPIDEMIA, UNSPECIFIED HYPERLIPIDEMIA TYPE: ICD-10-CM

## 2018-12-10 RX ORDER — SIMVASTATIN 80 MG
TABLET ORAL
Qty: 90 TABLET | Refills: 1 | Status: SHIPPED | OUTPATIENT
Start: 2018-12-10 | End: 2019-06-05 | Stop reason: SDUPTHER

## 2018-12-21 DIAGNOSIS — G89.29 CHRONIC LOW BACK PAIN, UNSPECIFIED BACK PAIN LATERALITY, WITH SCIATICA PRESENCE UNSPECIFIED: ICD-10-CM

## 2018-12-21 DIAGNOSIS — M54.5 CHRONIC LOW BACK PAIN, UNSPECIFIED BACK PAIN LATERALITY, WITH SCIATICA PRESENCE UNSPECIFIED: ICD-10-CM

## 2018-12-21 RX ORDER — TRAMADOL HYDROCHLORIDE 50 MG/1
50 TABLET ORAL DAILY
Qty: 30 TABLET | Refills: 0 | Status: SHIPPED | OUTPATIENT
Start: 2018-12-21 | End: 2019-01-22 | Stop reason: SDUPTHER

## 2018-12-21 NOTE — TELEPHONE ENCOUNTER
Patient called in requesting medication refill on her Tramadol for a 30 day supply to be sent to her [facility] on [address]    Patient has an upcoming appointment on 12/28/2018

## 2018-12-28 ENCOUNTER — OFFICE VISIT (OUTPATIENT)
Dept: FAMILY MEDICINE CLINIC | Facility: CLINIC | Age: 63
End: 2018-12-28
Payer: COMMERCIAL

## 2018-12-28 VITALS
RESPIRATION RATE: 18 BRPM | HEART RATE: 68 BPM | WEIGHT: 224 LBS | DIASTOLIC BLOOD PRESSURE: 72 MMHG | BODY MASS INDEX: 35.08 KG/M2 | SYSTOLIC BLOOD PRESSURE: 128 MMHG | TEMPERATURE: 97 F

## 2018-12-28 DIAGNOSIS — I10 BENIGN ESSENTIAL HYPERTENSION: ICD-10-CM

## 2018-12-28 DIAGNOSIS — E78.2 MIXED HYPERLIPIDEMIA: ICD-10-CM

## 2018-12-28 DIAGNOSIS — E11.9 TYPE 2 DIABETES MELLITUS WITHOUT COMPLICATION, WITHOUT LONG-TERM CURRENT USE OF INSULIN (HCC): Primary | ICD-10-CM

## 2018-12-28 DIAGNOSIS — J31.0 OTHER RHINITIS: ICD-10-CM

## 2018-12-28 DIAGNOSIS — Z23 NEED FOR DIPHTHERIA-TETANUS-PERTUSSIS (TDAP) VACCINE: ICD-10-CM

## 2018-12-28 LAB — SL AMB POCT HEMOGLOBIN AIC: 7.6

## 2018-12-28 PROCEDURE — 3078F DIAST BP <80 MM HG: CPT | Performed by: NURSE PRACTITIONER

## 2018-12-28 PROCEDURE — 99214 OFFICE O/P EST MOD 30 MIN: CPT | Performed by: NURSE PRACTITIONER

## 2018-12-28 PROCEDURE — 3045F PR MOST RECENT HEMOGLOBIN A1C LEVEL 7.0-9.0%: CPT | Performed by: NURSE PRACTITIONER

## 2018-12-28 PROCEDURE — 3074F SYST BP LT 130 MM HG: CPT | Performed by: NURSE PRACTITIONER

## 2018-12-28 PROCEDURE — 90471 IMMUNIZATION ADMIN: CPT | Performed by: NURSE PRACTITIONER

## 2018-12-28 PROCEDURE — 83036 HEMOGLOBIN GLYCOSYLATED A1C: CPT | Performed by: NURSE PRACTITIONER

## 2018-12-28 PROCEDURE — 90715 TDAP VACCINE 7 YRS/> IM: CPT | Performed by: NURSE PRACTITIONER

## 2018-12-28 RX ORDER — MONTELUKAST SODIUM 10 MG/1
10 TABLET ORAL
Qty: 30 TABLET | Refills: 0 | Status: SHIPPED | OUTPATIENT
Start: 2018-12-28 | End: 2019-01-30 | Stop reason: SDUPTHER

## 2018-12-28 NOTE — ASSESSMENT & PLAN NOTE
Lab Results   Component Value Date    HGBA1C 7 6 (H) 08/15/2018       No results for input(s): POCGLU in the last 72 hours  Blood Sugar Average: Last 72 hrs:  sugar little up  Reinforced diet control  Continue with same medications

## 2018-12-28 NOTE — PROGRESS NOTES
Chief Complaint   Patient presents with    Follow-up     Patient present today for a 6 month follow up on her Diabetes   Nasal Congestion     Patient complaints of head pressure, runny nose and watery eyes for the last 2 months  Assessment/Plan:    Type 2 diabetes mellitus without complication, without long-term current use of insulin ([facility])  Lab Results   Component Value Date    HGBA1C 7 6 (H) 08/15/2018       No results for input(s): POCGLU in the last 72 hours  Blood Sugar Average: Last 72 hrs:  sugar little up  Reinforced diet control  Continue with same medications  Benign essential hypertension  bp doing well  Diagnoses and all orders for this visit:    Type 2 diabetes mellitus without complication, without long-term current use of insulin (HCC)  -     POCT hemoglobin A1c  -     TSH, 3rd generation with Free T4 reflex; Future    Need for diphtheria-tetanus-pertussis (Tdap) vaccine  -     TDAP VACCINE GREATER THAN OR EQUAL TO 8YO IM    Mixed hyperlipidemia  -     Lipid panel; Future    Benign essential hypertension  -     Basic metabolic panel; Future    Other rhinitis  -     montelukast (SINGULAIR) 10 mg tablet; Take 1 tablet (10 mg total) by mouth daily at bedtime          Subjective:      Patient ID: Tawanda Cowart is a 61 y o  female  Patient interested in shingles vaccine but does not want to complete today due to tdap vaccination  Patient states she took herself off the aciphex but had terrible rebound heart burn that was constant  Diabetes   She presents for her follow-up diabetic visit  She has type 2 diabetes mellitus  Her disease course has been stable  Pertinent negatives for hypoglycemia include no dizziness or headaches  Pertinent negatives for diabetes include no blurred vision, no chest pain, no fatigue, no polydipsia, no polyphagia and no polyuria  Symptoms are stable  Current diabetic treatment includes diet and oral agent (monotherapy)   She is compliant with treatment all of the time  She is following a diabetic diet  When asked about meal planning, she reported none  She has not had a previous visit with a dietitian  An ACE inhibitor/angiotensin II receptor blocker is not being taken  She does not see a podiatrist Eye exam is current  The following portions of the patient's history were reviewed and updated as appropriate: allergies, current medications, past family history, past medical history, past social history, past surgical history and problem list     Review of Systems   Constitutional: Negative for fatigue and fever  HENT: Positive for rhinorrhea, sinus pain and sinus pressure  Negative for congestion and postnasal drip  She tried netti pot; saline solution  Eyes: Negative for blurred vision and visual disturbance  Respiratory: Negative for chest tightness and shortness of breath  Cardiovascular: Negative for chest pain, palpitations and leg swelling  Gastrointestinal: Negative  Endocrine: Negative for polydipsia, polyphagia and polyuria  Musculoskeletal: Negative  Skin: Negative  Neurological: Negative for dizziness, light-headedness and headaches  Psychiatric/Behavioral: Negative  Objective:      /72 (BP Location: Right arm, Patient Position: Sitting, Cuff Size: Standard)   Pulse 68   Temp (!) 97 °F (36 1 °C) (Temporal)   Resp 18   Wt 102 kg (224 lb)   BMI 35 08 kg/m²          Physical Exam   Constitutional: Vital signs are normal  She appears well-developed and well-nourished  She does not have a sickly appearance  She does not appear ill  HENT:   Head: Normocephalic and atraumatic  Right Ear: Tympanic membrane normal    Left Ear: Tympanic membrane normal    Mouth/Throat: Uvula is midline and mucous membranes are normal    Neck: No JVD present  Carotid bruit is not present  No thyromegaly present     Cardiovascular: Normal rate, regular rhythm, S1 normal and S2 normal     No murmur heard   Pulses:       Carotid pulses are 2+ on the right side, and 2+ on the left side  No Lower extremity Edema   Pulmonary/Chest: Effort normal and breath sounds normal    Abdominal: Soft  Normal appearance and bowel sounds are normal  There is no tenderness  Lymphadenopathy:     She has no cervical adenopathy  Skin: Skin is warm, dry and intact  Psychiatric: She has a normal mood and affect   Thought content normal

## 2019-01-09 DIAGNOSIS — K21.9 GASTROESOPHAGEAL REFLUX DISEASE, ESOPHAGITIS PRESENCE NOT SPECIFIED: Primary | ICD-10-CM

## 2019-01-09 DIAGNOSIS — I25.10 CORONARY ARTERY DISEASE INVOLVING NATIVE CORONARY ARTERY OF NATIVE HEART, ANGINA PRESENCE UNSPECIFIED: ICD-10-CM

## 2019-01-09 DIAGNOSIS — F41.9 ANXIETY: ICD-10-CM

## 2019-01-09 DIAGNOSIS — F32.A DEPRESSION, UNSPECIFIED DEPRESSION TYPE: ICD-10-CM

## 2019-01-09 RX ORDER — CLOPIDOGREL BISULFATE 75 MG/1
75 TABLET ORAL DAILY
Qty: 90 TABLET | Refills: 0 | Status: SHIPPED | OUTPATIENT
Start: 2019-01-09 | End: 2019-03-04 | Stop reason: SDUPTHER

## 2019-01-09 RX ORDER — VENLAFAXINE HYDROCHLORIDE 75 MG/1
75 CAPSULE, EXTENDED RELEASE ORAL DAILY
Qty: 90 CAPSULE | Refills: 1 | Status: SHIPPED | OUTPATIENT
Start: 2019-01-09 | End: 2019-08-08 | Stop reason: SDUPTHER

## 2019-01-09 RX ORDER — RABEPRAZOLE SODIUM 20 MG/1
20 TABLET, DELAYED RELEASE ORAL DAILY
Qty: 90 TABLET | Refills: 1 | Status: SHIPPED | OUTPATIENT
Start: 2019-01-09 | End: 2019-06-11 | Stop reason: SDUPTHER

## 2019-01-09 NOTE — TELEPHONE ENCOUNTER
Pt called requesting 90 day supply refill for clopidogrel 75mg, Rabeprazole 20mg and velafaxine 75mg to be sent to [facility] mail order pharmacy   Pt was last seen 12/28/18

## 2019-01-11 DIAGNOSIS — E11.9 TYPE 2 DIABETES MELLITUS WITHOUT COMPLICATION, WITHOUT LONG-TERM CURRENT USE OF INSULIN (HCC): ICD-10-CM

## 2019-01-11 NOTE — TELEPHONE ENCOUNTER
[facility] pharmacy faxed us a refill request for patient's Metformin 1000 mg for a 90 day supply    Last ov was 12/28/2018

## 2019-01-22 DIAGNOSIS — G89.29 CHRONIC LOW BACK PAIN, UNSPECIFIED BACK PAIN LATERALITY, WITH SCIATICA PRESENCE UNSPECIFIED: ICD-10-CM

## 2019-01-22 DIAGNOSIS — M54.5 CHRONIC LOW BACK PAIN, UNSPECIFIED BACK PAIN LATERALITY, WITH SCIATICA PRESENCE UNSPECIFIED: ICD-10-CM

## 2019-01-22 RX ORDER — TRAMADOL HYDROCHLORIDE 50 MG/1
50 TABLET ORAL DAILY
Qty: 30 TABLET | Refills: 0 | Status: SHIPPED | OUTPATIENT
Start: 2019-01-22 | End: 2019-02-25 | Stop reason: SDUPTHER

## 2019-01-30 DIAGNOSIS — J31.0 OTHER RHINITIS: ICD-10-CM

## 2019-01-30 RX ORDER — MONTELUKAST SODIUM 10 MG/1
10 TABLET ORAL
Qty: 30 TABLET | Refills: 5 | Status: SHIPPED | OUTPATIENT
Start: 2019-01-30

## 2019-02-18 DIAGNOSIS — E11.9 TYPE 2 DIABETES MELLITUS WITHOUT COMPLICATION, WITHOUT LONG-TERM CURRENT USE OF INSULIN (HCC): ICD-10-CM

## 2019-02-19 RX ORDER — LIRAGLUTIDE 6 MG/ML
INJECTION SUBCUTANEOUS
Qty: 27 ML | Refills: 0 | Status: SHIPPED | OUTPATIENT
Start: 2019-02-19 | End: 2019-03-18 | Stop reason: SDUPTHER

## 2019-02-20 DIAGNOSIS — E11.9 TYPE 2 DIABETES MELLITUS WITHOUT COMPLICATION, WITH LONG-TERM CURRENT USE OF INSULIN (HCC): ICD-10-CM

## 2019-02-20 DIAGNOSIS — Z79.4 TYPE 2 DIABETES MELLITUS WITHOUT COMPLICATION, WITH LONG-TERM CURRENT USE OF INSULIN (HCC): ICD-10-CM

## 2019-02-20 RX ORDER — PEN NEEDLE, DIABETIC 31 GX5/16"
1 NEEDLE, DISPOSABLE MISCELLANEOUS DAILY
Qty: 100 EACH | Refills: 5 | Status: SHIPPED | OUTPATIENT
Start: 2019-02-20 | End: 2019-02-21 | Stop reason: SDUPTHER

## 2019-02-21 DIAGNOSIS — E11.9 TYPE 2 DIABETES MELLITUS WITHOUT COMPLICATION, WITH LONG-TERM CURRENT USE OF INSULIN (HCC): ICD-10-CM

## 2019-02-21 DIAGNOSIS — Z79.4 TYPE 2 DIABETES MELLITUS WITHOUT COMPLICATION, WITH LONG-TERM CURRENT USE OF INSULIN (HCC): ICD-10-CM

## 2019-02-22 ENCOUNTER — TELEPHONE (OUTPATIENT)
Dept: FAMILY MEDICINE CLINIC | Facility: CLINIC | Age: 64
End: 2019-02-22

## 2019-02-22 RX ORDER — PEN NEEDLE, DIABETIC 31 GX5/16"
1 NEEDLE, DISPOSABLE MISCELLANEOUS DAILY
Qty: 100 EACH | Refills: 1 | Status: SHIPPED | OUTPATIENT
Start: 2019-02-22

## 2019-02-22 NOTE — TELEPHONE ENCOUNTER
Pt called to ask if there was any way to reduce the cost of her Victoza, pt states the 3 month supply will cost her $2000 or maybe try another med   Please advise

## 2019-02-22 NOTE — TELEPHONE ENCOUNTER
Recommend she try to reach out to pharmaceutical company to look into their patient assistance programs  Information is available online

## 2019-02-25 DIAGNOSIS — M54.5 CHRONIC LOW BACK PAIN, UNSPECIFIED BACK PAIN LATERALITY, WITH SCIATICA PRESENCE UNSPECIFIED: ICD-10-CM

## 2019-02-25 DIAGNOSIS — G89.29 CHRONIC LOW BACK PAIN, UNSPECIFIED BACK PAIN LATERALITY, WITH SCIATICA PRESENCE UNSPECIFIED: ICD-10-CM

## 2019-02-25 RX ORDER — TRAMADOL HYDROCHLORIDE 50 MG/1
50 TABLET ORAL DAILY
Qty: 30 TABLET | Refills: 0 | Status: SHIPPED | OUTPATIENT
Start: 2019-02-25 | End: 2019-03-25 | Stop reason: SDUPTHER

## 2019-03-03 DIAGNOSIS — I25.10 CORONARY ARTERY DISEASE INVOLVING NATIVE CORONARY ARTERY OF NATIVE HEART, ANGINA PRESENCE UNSPECIFIED: ICD-10-CM

## 2019-03-04 RX ORDER — CLOPIDOGREL BISULFATE 75 MG/1
TABLET ORAL
Qty: 90 TABLET | Refills: 1 | Status: SHIPPED | OUTPATIENT
Start: 2019-03-04 | End: 2019-04-11 | Stop reason: SDUPTHER

## 2019-03-14 ENCOUNTER — TELEPHONE (OUTPATIENT)
Dept: FAMILY MEDICINE CLINIC | Facility: CLINIC | Age: 64
End: 2019-03-14

## 2019-03-14 NOTE — TELEPHONE ENCOUNTER
Pt called requesting for a printed Rx for her Victoza to be faxed to  [facility] at [phone]  Pt states medication will be more affordable is she gets it straight from   Please advise

## 2019-03-18 DIAGNOSIS — E11.9 TYPE 2 DIABETES MELLITUS WITHOUT COMPLICATION, WITHOUT LONG-TERM CURRENT USE OF INSULIN (HCC): ICD-10-CM

## 2019-03-18 NOTE — TELEPHONE ENCOUNTER
Pt called and stated she cannot afford her Victoza pens through the pharmacy so she canceled the prescription  She would like for you to fax the prescription to the  [facility], at [phone]   She said to send a 90 day supply

## 2019-03-18 NOTE — TELEPHONE ENCOUNTER
I believe I received paperwork for the patient assistance program  Is this what patient is referring to?

## 2019-03-19 NOTE — TELEPHONE ENCOUNTER
Ok I must complete paperwork and submit rx  We will do that at the same time   Hoping to be able to complete this today to have it sent over to them

## 2019-03-20 ENCOUNTER — TELEPHONE (OUTPATIENT)
Dept: FAMILY MEDICINE CLINIC | Facility: CLINIC | Age: 64
End: 2019-03-20

## 2019-03-21 NOTE — TELEPHONE ENCOUNTER
Pt notified that form was completed and faxed together with Victoza Rx to [facility] at [phone]  [employer] will be picked up by pt  Forms in brown folder in drawer

## 2019-03-22 DIAGNOSIS — I25.10 CORONARY ARTERY DISEASE INVOLVING NATIVE CORONARY ARTERY OF NATIVE HEART WITHOUT ANGINA PECTORIS: ICD-10-CM

## 2019-03-22 DIAGNOSIS — I10 BENIGN ESSENTIAL HYPERTENSION: Primary | ICD-10-CM

## 2019-03-24 RX ORDER — METOPROLOL TARTRATE 50 MG/1
50 TABLET, FILM COATED ORAL DAILY
Qty: 90 TABLET | Refills: 1 | Status: SHIPPED | OUTPATIENT
Start: 2019-03-24 | End: 2019-05-31 | Stop reason: SDUPTHER

## 2019-03-25 DIAGNOSIS — G89.29 CHRONIC LOW BACK PAIN, UNSPECIFIED BACK PAIN LATERALITY, WITH SCIATICA PRESENCE UNSPECIFIED: ICD-10-CM

## 2019-03-25 DIAGNOSIS — M54.5 CHRONIC LOW BACK PAIN, UNSPECIFIED BACK PAIN LATERALITY, WITH SCIATICA PRESENCE UNSPECIFIED: ICD-10-CM

## 2019-03-25 RX ORDER — TRAMADOL HYDROCHLORIDE 50 MG/1
50 TABLET ORAL DAILY
Qty: 30 TABLET | Refills: 3 | Status: SHIPPED | OUTPATIENT
Start: 2019-03-25 | End: 2019-07-22 | Stop reason: SDUPTHER

## 2019-03-25 NOTE — TELEPHONE ENCOUNTER
Pt requests a refill for her Tramadol 50 mg tablets, 30 day supply  Please send to [facility] on [address] [address] [facility]

## 2019-03-28 ENCOUNTER — TELEPHONE (OUTPATIENT)
Dept: FAMILY MEDICINE CLINIC | Facility: CLINIC | Age: 64
End: 2019-03-28

## 2019-04-10 DIAGNOSIS — I25.10 CORONARY ARTERY DISEASE INVOLVING NATIVE CORONARY ARTERY OF NATIVE HEART, ANGINA PRESENCE UNSPECIFIED: ICD-10-CM

## 2019-04-11 RX ORDER — CLOPIDOGREL BISULFATE 75 MG/1
TABLET ORAL
Qty: 90 TABLET | Refills: 0 | Status: SHIPPED | OUTPATIENT
Start: 2019-04-11 | End: 2019-08-01 | Stop reason: SDUPTHER

## 2019-04-22 DIAGNOSIS — E78.2 MIXED HYPERLIPIDEMIA: ICD-10-CM

## 2019-04-23 RX ORDER — EZETIMIBE 10 MG/1
10 TABLET ORAL DAILY
Qty: 90 TABLET | Refills: 1 | OUTPATIENT
Start: 2019-04-23 | End: 2019-07-22

## 2019-04-24 DIAGNOSIS — E78.2 MIXED HYPERLIPIDEMIA: ICD-10-CM

## 2019-04-25 RX ORDER — EZETIMIBE 10 MG/1
10 TABLET ORAL DAILY
Qty: 90 TABLET | Refills: 1 | Status: SHIPPED | OUTPATIENT
Start: 2019-04-25 | End: 2019-11-10 | Stop reason: SDUPTHER

## 2019-04-28 DIAGNOSIS — E11.9 TYPE 2 DIABETES MELLITUS WITHOUT COMPLICATION, WITHOUT LONG-TERM CURRENT USE OF INSULIN (HCC): ICD-10-CM

## 2019-05-31 DIAGNOSIS — I10 BENIGN ESSENTIAL HYPERTENSION: ICD-10-CM

## 2019-05-31 DIAGNOSIS — I25.10 CORONARY ARTERY DISEASE INVOLVING NATIVE CORONARY ARTERY OF NATIVE HEART WITHOUT ANGINA PECTORIS: ICD-10-CM

## 2019-06-03 RX ORDER — METOPROLOL TARTRATE 50 MG/1
50 TABLET, FILM COATED ORAL DAILY
Qty: 90 TABLET | Refills: 1 | Status: SHIPPED | OUTPATIENT
Start: 2019-06-03 | End: 2019-06-27 | Stop reason: DRUGHIGH

## 2019-06-05 DIAGNOSIS — E78.2 MIXED HYPERLIPIDEMIA: ICD-10-CM

## 2019-06-05 RX ORDER — SIMVASTATIN 80 MG
TABLET ORAL
Qty: 90 TABLET | Refills: 1 | Status: SHIPPED | OUTPATIENT
Start: 2019-06-05 | End: 2019-11-25 | Stop reason: SDUPTHER

## 2019-06-11 DIAGNOSIS — K21.9 GASTROESOPHAGEAL REFLUX DISEASE, ESOPHAGITIS PRESENCE NOT SPECIFIED: ICD-10-CM

## 2019-06-11 RX ORDER — RABEPRAZOLE SODIUM 20 MG/1
TABLET, DELAYED RELEASE ORAL
Qty: 90 TABLET | Refills: 1 | Status: SHIPPED | OUTPATIENT
Start: 2019-06-11 | End: 2019-12-25 | Stop reason: SDUPTHER

## 2019-06-27 ENCOUNTER — OFFICE VISIT (OUTPATIENT)
Dept: FAMILY MEDICINE CLINIC | Facility: CLINIC | Age: 64
End: 2019-06-27
Payer: COMMERCIAL

## 2019-06-27 VITALS
TEMPERATURE: 97.5 F | DIASTOLIC BLOOD PRESSURE: 80 MMHG | HEART RATE: 70 BPM | BODY MASS INDEX: 33.87 KG/M2 | RESPIRATION RATE: 18 BRPM | HEIGHT: 67 IN | WEIGHT: 215.8 LBS | SYSTOLIC BLOOD PRESSURE: 120 MMHG

## 2019-06-27 DIAGNOSIS — F41.9 ANXIETY: ICD-10-CM

## 2019-06-27 DIAGNOSIS — G89.29 CHRONIC LOW BACK PAIN, UNSPECIFIED BACK PAIN LATERALITY, WITH SCIATICA PRESENCE UNSPECIFIED: ICD-10-CM

## 2019-06-27 DIAGNOSIS — I25.118 CORONARY ARTERY DISEASE INVOLVING NATIVE HEART WITH OTHER FORM OF ANGINA PECTORIS, UNSPECIFIED VESSEL OR LESION TYPE (HCC): ICD-10-CM

## 2019-06-27 DIAGNOSIS — E66.09 CLASS 1 OBESITY DUE TO EXCESS CALORIES WITH SERIOUS COMORBIDITY AND BODY MASS INDEX (BMI) OF 33.0 TO 33.9 IN ADULT: ICD-10-CM

## 2019-06-27 DIAGNOSIS — E11.9 TYPE 2 DIABETES MELLITUS WITHOUT COMPLICATION, WITHOUT LONG-TERM CURRENT USE OF INSULIN (HCC): Primary | ICD-10-CM

## 2019-06-27 DIAGNOSIS — E78.2 MIXED HYPERLIPIDEMIA: ICD-10-CM

## 2019-06-27 DIAGNOSIS — M54.5 CHRONIC LOW BACK PAIN, UNSPECIFIED BACK PAIN LATERALITY, WITH SCIATICA PRESENCE UNSPECIFIED: ICD-10-CM

## 2019-06-27 DIAGNOSIS — Z12.39 SCREENING FOR BREAST CANCER: ICD-10-CM

## 2019-06-27 DIAGNOSIS — I10 BENIGN ESSENTIAL HYPERTENSION: ICD-10-CM

## 2019-06-27 DIAGNOSIS — K21.9 GASTROESOPHAGEAL REFLUX DISEASE WITHOUT ESOPHAGITIS: ICD-10-CM

## 2019-06-27 PROBLEM — M54.50 CHRONIC LOW BACK PAIN: Status: ACTIVE | Noted: 2019-06-27

## 2019-06-27 LAB
CREAT UR-MCNC: 310 MG/DL
MICROALBUMIN UR-MCNC: 33 MG/L (ref 0–20)
MICROALBUMIN/CREAT 24H UR: 11 MG/G CREATININE (ref 0–30)
SL AMB POCT HEMOGLOBIN AIC: 7.2 (ref ?–6.5)

## 2019-06-27 PROCEDURE — 80307 DRUG TEST PRSMV CHEM ANLYZR: CPT | Performed by: NURSE PRACTITIONER

## 2019-06-27 PROCEDURE — 99214 OFFICE O/P EST MOD 30 MIN: CPT | Performed by: NURSE PRACTITIONER

## 2019-06-27 PROCEDURE — 3079F DIAST BP 80-89 MM HG: CPT | Performed by: NURSE PRACTITIONER

## 2019-06-27 PROCEDURE — 3008F BODY MASS INDEX DOCD: CPT | Performed by: NURSE PRACTITIONER

## 2019-06-27 PROCEDURE — 83036 HEMOGLOBIN GLYCOSYLATED A1C: CPT | Performed by: NURSE PRACTITIONER

## 2019-06-27 PROCEDURE — 82043 UR ALBUMIN QUANTITATIVE: CPT | Performed by: NURSE PRACTITIONER

## 2019-06-27 PROCEDURE — 3074F SYST BP LT 130 MM HG: CPT | Performed by: NURSE PRACTITIONER

## 2019-06-27 PROCEDURE — 3045F PR MOST RECENT HEMOGLOBIN A1C LEVEL 7.0-9.0%: CPT | Performed by: NURSE PRACTITIONER

## 2019-06-27 PROCEDURE — 3061F NEG MICROALBUMINURIA REV: CPT | Performed by: NURSE PRACTITIONER

## 2019-06-27 PROCEDURE — 1036F TOBACCO NON-USER: CPT | Performed by: NURSE PRACTITIONER

## 2019-06-27 PROCEDURE — 82570 ASSAY OF URINE CREATININE: CPT | Performed by: NURSE PRACTITIONER

## 2019-06-27 RX ORDER — METOPROLOL TARTRATE 50 MG/1
50 TABLET, FILM COATED ORAL EVERY 12 HOURS SCHEDULED
COMMUNITY
End: 2019-10-28 | Stop reason: SDUPTHER

## 2019-06-27 RX ORDER — NITROGLYCERIN 0.4 MG/1
0.4 TABLET SUBLINGUAL
Qty: 100 TABLET | Refills: 0 | Status: SHIPPED | OUTPATIENT
Start: 2019-06-27 | End: 2020-06-26

## 2019-06-28 LAB
LABORATORY COMMENT REPORT: ABNORMAL
TRAMADOL UR QL SCN: POSITIVE NG/ML

## 2019-07-16 ENCOUNTER — TELEPHONE (OUTPATIENT)
Dept: FAMILY MEDICINE CLINIC | Facility: CLINIC | Age: 64
End: 2019-07-16

## 2019-07-16 DIAGNOSIS — E11.9 TYPE 2 DIABETES MELLITUS WITHOUT COMPLICATION, WITHOUT LONG-TERM CURRENT USE OF INSULIN (HCC): ICD-10-CM

## 2019-07-16 NOTE — TELEPHONE ENCOUNTER
Patient called stating she is in the process of applying for the patient assistance program for [employer] for her Victoza 1 2MG  They need a 90 day prescription faxed to [phone]

## 2019-07-22 DIAGNOSIS — M54.5 CHRONIC LOW BACK PAIN, UNSPECIFIED BACK PAIN LATERALITY, WITH SCIATICA PRESENCE UNSPECIFIED: ICD-10-CM

## 2019-07-22 DIAGNOSIS — G89.29 CHRONIC LOW BACK PAIN, UNSPECIFIED BACK PAIN LATERALITY, WITH SCIATICA PRESENCE UNSPECIFIED: ICD-10-CM

## 2019-07-22 RX ORDER — TRAMADOL HYDROCHLORIDE 50 MG/1
50 TABLET ORAL DAILY
Qty: 30 TABLET | Refills: 3 | Status: SHIPPED | OUTPATIENT
Start: 2019-07-22 | End: 2019-11-22 | Stop reason: SDUPTHER

## 2019-08-01 ENCOUNTER — TELEPHONE (OUTPATIENT)
Dept: FAMILY MEDICINE CLINIC | Facility: CLINIC | Age: 64
End: 2019-08-01

## 2019-08-01 DIAGNOSIS — I25.10 CORONARY ARTERY DISEASE INVOLVING NATIVE CORONARY ARTERY OF NATIVE HEART, ANGINA PRESENCE UNSPECIFIED: ICD-10-CM

## 2019-08-01 RX ORDER — CLOPIDOGREL BISULFATE 75 MG/1
TABLET ORAL
Qty: 90 TABLET | Refills: 1 | Status: SHIPPED | OUTPATIENT
Start: 2019-08-01 | End: 2019-10-06 | Stop reason: SDUPTHER

## 2019-08-01 NOTE — TELEPHONE ENCOUNTER
I called pt to let her know her Victoza rec'd through [facility]  Program has arrived and she will be by to  the medication today

## 2019-08-08 DIAGNOSIS — F41.9 ANXIETY: ICD-10-CM

## 2019-08-08 RX ORDER — VENLAFAXINE HYDROCHLORIDE 75 MG/1
75 CAPSULE, EXTENDED RELEASE ORAL DAILY
Qty: 90 CAPSULE | Refills: 1 | Status: SHIPPED | OUTPATIENT
Start: 2019-08-08 | End: 2019-12-25 | Stop reason: SDUPTHER

## 2019-10-06 DIAGNOSIS — I25.10 CORONARY ARTERY DISEASE INVOLVING NATIVE CORONARY ARTERY OF NATIVE HEART, ANGINA PRESENCE UNSPECIFIED: ICD-10-CM

## 2019-10-06 DIAGNOSIS — E11.9 TYPE 2 DIABETES MELLITUS WITHOUT COMPLICATION, WITHOUT LONG-TERM CURRENT USE OF INSULIN (HCC): ICD-10-CM

## 2019-10-06 RX ORDER — CLOPIDOGREL BISULFATE 75 MG/1
TABLET ORAL
Qty: 90 TABLET | Refills: 1 | Status: SHIPPED | OUTPATIENT
Start: 2019-10-06 | End: 2020-01-13 | Stop reason: SDUPTHER

## 2019-10-14 ENCOUNTER — HOSPITAL ENCOUNTER (OUTPATIENT)
Dept: RADIOLOGY | Age: 64
Discharge: HOME | End: 2019-10-14
Payer: COMMERCIAL

## 2019-10-14 VITALS — WEIGHT: 215 LBS | HEIGHT: 67 IN | BODY MASS INDEX: 33.74 KG/M2

## 2019-10-14 DIAGNOSIS — Z12.39 SCREENING FOR BREAST CANCER: ICD-10-CM

## 2019-10-14 PROCEDURE — 77063 BREAST TOMOSYNTHESIS BI: CPT

## 2019-10-14 PROCEDURE — 77067 SCR MAMMO BI INCL CAD: CPT

## 2019-10-17 LAB
BUN SERPL-MCNC: 17 MG/DL (ref 7–25)
BUN/CREAT SERPL: ABNORMAL (CALC) (ref 6–22)
CALCIUM SERPL-MCNC: 9.4 MG/DL (ref 8.6–10.4)
CHLORIDE SERPL-SCNC: 102 MMOL/L (ref 98–110)
CHOLEST SERPL-MCNC: 160 MG/DL
CHOLEST/HDLC SERPL: 3.9 (CALC)
CO2 SERPL-SCNC: 31 MMOL/L (ref 20–32)
CREAT SERPL-MCNC: 0.82 MG/DL (ref 0.5–0.99)
GLUCOSE SERPL-MCNC: 175 MG/DL (ref 65–99)
HDLC SERPL-MCNC: 41 MG/DL
LDLC SERPL CALC-MCNC: 88 MG/DL (CALC)
NONHDLC SERPL-MCNC: 119 MG/DL (CALC)
POTASSIUM SERPL-SCNC: 4.5 MMOL/L (ref 3.5–5.3)
SL AMB EGFR AFRICAN AMERICAN: 88 ML/MIN/1.73M2
SL AMB EGFR NON AFRICAN AMERICAN: 76 ML/MIN/1.73M2
SODIUM SERPL-SCNC: 140 MMOL/L (ref 135–146)
TRIGL SERPL-MCNC: 224 MG/DL
TSH SERPL-ACNC: 2.53 MIU/L (ref 0.4–4.5)

## 2019-10-27 DIAGNOSIS — I10 BENIGN ESSENTIAL HYPERTENSION: ICD-10-CM

## 2019-10-27 DIAGNOSIS — I25.10 CORONARY ARTERY DISEASE INVOLVING NATIVE CORONARY ARTERY OF NATIVE HEART WITHOUT ANGINA PECTORIS: ICD-10-CM

## 2019-10-28 RX ORDER — METOPROLOL TARTRATE 50 MG/1
TABLET, FILM COATED ORAL
Qty: 90 TABLET | Refills: 1 | Status: SHIPPED | OUTPATIENT
Start: 2019-10-28 | End: 2019-11-12 | Stop reason: SDUPTHER

## 2019-11-10 DIAGNOSIS — E78.2 MIXED HYPERLIPIDEMIA: ICD-10-CM

## 2019-11-10 RX ORDER — EZETIMIBE 10 MG/1
10 TABLET ORAL DAILY
Qty: 90 TABLET | Refills: 1 | Status: SHIPPED | OUTPATIENT
Start: 2019-11-10 | End: 2019-11-12 | Stop reason: SDUPTHER

## 2019-11-12 DIAGNOSIS — I10 BENIGN ESSENTIAL HYPERTENSION: ICD-10-CM

## 2019-11-12 DIAGNOSIS — I25.10 CORONARY ARTERY DISEASE INVOLVING NATIVE CORONARY ARTERY OF NATIVE HEART WITHOUT ANGINA PECTORIS: ICD-10-CM

## 2019-11-12 DIAGNOSIS — E78.2 MIXED HYPERLIPIDEMIA: ICD-10-CM

## 2019-11-12 RX ORDER — METOPROLOL TARTRATE 50 MG/1
50 TABLET, FILM COATED ORAL DAILY
Qty: 90 TABLET | Refills: 1 | Status: SHIPPED | OUTPATIENT
Start: 2019-11-12 | End: 2019-12-13 | Stop reason: SDUPTHER

## 2019-11-12 RX ORDER — EZETIMIBE 10 MG/1
10 TABLET ORAL DAILY
Qty: 90 TABLET | Refills: 1 | Status: SHIPPED | OUTPATIENT
Start: 2019-11-12 | End: 2020-02-10

## 2019-11-21 DIAGNOSIS — E11.9 TYPE 2 DIABETES MELLITUS WITHOUT COMPLICATION, WITHOUT LONG-TERM CURRENT USE OF INSULIN (HCC): ICD-10-CM

## 2019-11-21 NOTE — TELEPHONE ENCOUNTER
Please approve   This printed RX must go along with the form I currently have for the Patient Assistance Program

## 2019-11-22 DIAGNOSIS — M54.9 BACK PAIN, UNSPECIFIED BACK LOCATION, UNSPECIFIED BACK PAIN LATERALITY, UNSPECIFIED CHRONICITY: ICD-10-CM

## 2019-11-22 RX ORDER — TRAMADOL HYDROCHLORIDE 50 MG/1
50 TABLET ORAL DAILY
Qty: 30 TABLET | Refills: 3 | Status: SHIPPED | OUTPATIENT
Start: 2019-11-22

## 2019-11-25 DIAGNOSIS — E78.2 MIXED HYPERLIPIDEMIA: ICD-10-CM

## 2019-11-26 RX ORDER — SIMVASTATIN 80 MG
TABLET ORAL
Qty: 90 TABLET | Refills: 1 | Status: SHIPPED | OUTPATIENT
Start: 2019-11-26

## 2019-12-02 ENCOUNTER — TELEPHONE (OUTPATIENT)
Dept: FAMILY MEDICINE CLINIC | Facility: CLINIC | Age: 64
End: 2019-12-02

## 2019-12-02 NOTE — TELEPHONE ENCOUNTER
PT CALLED IN BECAUSE SHE SD THAT SHE NEEDS A REFILL FOR VICTOZA AND MUST BE SENT TO [address] [facility]  SHE NEEDS A 90 DAY PRESCRIPTION   PLEASE FAX -[phone]  THANK YOU     12/5/19  PT CALLED BACK WANTING TO KNOW STATUS OF HER REFILLS

## 2019-12-05 DIAGNOSIS — E11.9 TYPE 2 DIABETES MELLITUS WITHOUT COMPLICATION, WITHOUT LONG-TERM CURRENT USE OF INSULIN (HCC): ICD-10-CM

## 2019-12-05 NOTE — TELEPHONE ENCOUNTER
Please refill  We need to fax this to [employer]  @ [phone]  Pt would like a 90 day supply

## 2019-12-13 ENCOUNTER — TELEPHONE (OUTPATIENT)
Dept: FAMILY MEDICINE CLINIC | Facility: CLINIC | Age: 64
End: 2019-12-13

## 2019-12-13 DIAGNOSIS — I25.10 CORONARY ARTERY DISEASE INVOLVING NATIVE CORONARY ARTERY OF NATIVE HEART WITHOUT ANGINA PECTORIS: ICD-10-CM

## 2019-12-13 DIAGNOSIS — I10 BENIGN ESSENTIAL HYPERTENSION: ICD-10-CM

## 2019-12-13 RX ORDER — METOPROLOL TARTRATE 50 MG/1
50 TABLET, FILM COATED ORAL EVERY 12 HOURS SCHEDULED
Qty: 180 TABLET | Refills: 1 | Status: SHIPPED | OUTPATIENT
Start: 2019-12-13

## 2019-12-13 NOTE — TELEPHONE ENCOUNTER
PT SEES JENNY  HER METROPOLOL SUCCINATE SCRIPT IS COMING THROUGH AS ONCE A DAY AND IT SHOULD BE TWICE A DAY  JENNY WAS WORKING ON THIS AT HER LAST VISIT  IT IS NOT STRAIGHTENED OUT YET  PLEASE ADVISE  THANK YOU

## 2019-12-20 ENCOUNTER — TELEPHONE (OUTPATIENT)
Dept: FAMILY MEDICINE CLINIC | Facility: CLINIC | Age: 64
End: 2019-12-20

## 2019-12-20 NOTE — TELEPHONE ENCOUNTER
JJ FROM [facility] STATED THE FORM TO REFILL THE Verna Colvin THE FAX # NUMBER MUST MATCH THE FAX LISTED ON THE FORM SO THAT THEY KNOW ITS ACTUALLY COMING FROM DOCTOR'S OFFICE? ?? PLEASE REFAX      IF YOU HAVE ANY QUESTION PLEASE CALL Jah Fernández [phone]

## 2019-12-25 DIAGNOSIS — F41.9 ANXIETY: ICD-10-CM

## 2019-12-25 DIAGNOSIS — K21.9 GASTROESOPHAGEAL REFLUX DISEASE, ESOPHAGITIS PRESENCE NOT SPECIFIED: ICD-10-CM

## 2019-12-25 RX ORDER — VENLAFAXINE HYDROCHLORIDE 75 MG/1
CAPSULE, EXTENDED RELEASE ORAL
Qty: 90 CAPSULE | Refills: 1 | Status: SHIPPED | OUTPATIENT
Start: 2019-12-25

## 2019-12-25 RX ORDER — RABEPRAZOLE SODIUM 20 MG/1
TABLET, DELAYED RELEASE ORAL
Qty: 90 TABLET | Refills: 1 | Status: SHIPPED | OUTPATIENT
Start: 2019-12-25

## 2020-01-10 DIAGNOSIS — I25.10 CORONARY ARTERY DISEASE INVOLVING NATIVE CORONARY ARTERY OF NATIVE HEART, ANGINA PRESENCE UNSPECIFIED: ICD-10-CM

## 2020-01-13 DIAGNOSIS — I25.10 CORONARY ARTERY DISEASE INVOLVING NATIVE CORONARY ARTERY OF NATIVE HEART, ANGINA PRESENCE UNSPECIFIED: ICD-10-CM

## 2020-01-13 RX ORDER — CLOPIDOGREL BISULFATE 75 MG/1
TABLET ORAL
Qty: 30 TABLET | Refills: 0 | OUTPATIENT
Start: 2020-01-13

## 2020-01-13 RX ORDER — CLOPIDOGREL BISULFATE 75 MG/1
TABLET ORAL
Qty: 90 TABLET | Refills: 0 | OUTPATIENT
Start: 2020-01-13

## 2020-01-13 RX ORDER — CLOPIDOGREL BISULFATE 75 MG/1
75 TABLET ORAL DAILY
Qty: 30 TABLET | Refills: 0 | Status: SHIPPED | OUTPATIENT
Start: 2020-01-13

## 2020-01-13 NOTE — TELEPHONE ENCOUNTER
Called pt to schedule appt, pt needs rx refill  Pt states that due to her insurance she has to find another pcp  We can give her a 30 day supply until she finds another pcp  Pt aware   Please send rx to [facility] on [address]

## 2020-01-14 DIAGNOSIS — I25.10 CORONARY ARTERY DISEASE INVOLVING NATIVE CORONARY ARTERY OF NATIVE HEART, ANGINA PRESENCE UNSPECIFIED: ICD-10-CM

## 2020-01-14 RX ORDER — CLOPIDOGREL BISULFATE 75 MG/1
TABLET ORAL
Qty: 90 TABLET | Refills: 0 | OUTPATIENT
Start: 2020-01-14

## 2020-05-27 DIAGNOSIS — E78.2 MIXED HYPERLIPIDEMIA: ICD-10-CM

## 2020-09-16 DIAGNOSIS — I10 BENIGN ESSENTIAL HYPERTENSION: ICD-10-CM

## 2020-09-16 DIAGNOSIS — F41.9 ANXIETY: ICD-10-CM

## 2020-09-16 DIAGNOSIS — I25.10 CORONARY ARTERY DISEASE INVOLVING NATIVE CORONARY ARTERY OF NATIVE HEART WITHOUT ANGINA PECTORIS: ICD-10-CM

## 2020-09-16 RX ORDER — VENLAFAXINE HYDROCHLORIDE 75 MG/1
75 CAPSULE, EXTENDED RELEASE ORAL DAILY
Qty: 90 CAPSULE | Refills: 0 | OUTPATIENT
Start: 2020-09-16

## 2020-09-16 RX ORDER — METOPROLOL TARTRATE 50 MG/1
50 TABLET, FILM COATED ORAL EVERY 12 HOURS SCHEDULED
Qty: 180 TABLET | Refills: 0 | OUTPATIENT
Start: 2020-09-16

## 2020-09-22 DIAGNOSIS — I25.10 CORONARY ARTERY DISEASE INVOLVING NATIVE CORONARY ARTERY OF NATIVE HEART, ANGINA PRESENCE UNSPECIFIED: ICD-10-CM

## 2020-09-22 RX ORDER — CLOPIDOGREL BISULFATE 75 MG/1
75 TABLET ORAL DAILY
Qty: 30 TABLET | Refills: 5 | Status: CANCELLED | OUTPATIENT
Start: 2020-09-22

## 2021-03-02 ENCOUNTER — TRANSCRIBE ORDERS (OUTPATIENT)
Dept: ADMINISTRATIVE | Facility: HOSPITAL | Age: 66
End: 2021-03-02

## 2021-03-02 DIAGNOSIS — Z12.31 ENCOUNTER FOR SCREENING MAMMOGRAM FOR MALIGNANT NEOPLASM OF BREAST: Primary | ICD-10-CM

## 2021-03-10 DIAGNOSIS — Z23 ENCOUNTER FOR IMMUNIZATION: ICD-10-CM

## 2021-06-07 DIAGNOSIS — I25.10 CORONARY ARTERY DISEASE INVOLVING NATIVE CORONARY ARTERY OF NATIVE HEART: ICD-10-CM

## 2021-06-07 RX ORDER — CLOPIDOGREL BISULFATE 75 MG/1
75 TABLET ORAL DAILY
Qty: 90 TABLET | Refills: 1 | OUTPATIENT
Start: 2021-06-07

## (undated) DEVICE — ESOPHAGEAL/COLONIC/BILIARY WIREGUIDED BALLOON DILATATION CATHETER: Brand: CRE™ PRO

## (undated) DEVICE — WORKING LENGTH 235CM, WORKING CHANNEL 2.8MM: Brand: RESOLUTION 360 CLIP